# Patient Record
Sex: FEMALE | Race: WHITE | NOT HISPANIC OR LATINO | Employment: FULL TIME | URBAN - METROPOLITAN AREA
[De-identification: names, ages, dates, MRNs, and addresses within clinical notes are randomized per-mention and may not be internally consistent; named-entity substitution may affect disease eponyms.]

---

## 2019-05-16 ENCOUNTER — OFFICE VISIT (OUTPATIENT)
Dept: RETAIL CLINIC | Facility: CLINIC | Age: 34
End: 2019-05-16

## 2019-05-16 VITALS
OXYGEN SATURATION: 99 % | DIASTOLIC BLOOD PRESSURE: 92 MMHG | RESPIRATION RATE: 18 BRPM | TEMPERATURE: 98.7 F | SYSTOLIC BLOOD PRESSURE: 140 MMHG | HEART RATE: 125 BPM

## 2019-05-16 DIAGNOSIS — H66.91 ACUTE OTITIS MEDIA, RIGHT: ICD-10-CM

## 2019-05-16 DIAGNOSIS — J02.9 PHARYNGITIS, UNSPECIFIED ETIOLOGY: Primary | ICD-10-CM

## 2019-05-16 DIAGNOSIS — J06.9 ACUTE URI: ICD-10-CM

## 2019-05-16 PROCEDURE — 99213 OFFICE O/P EST LOW 20 MIN: CPT | Performed by: NURSE PRACTITIONER

## 2019-05-16 RX ORDER — AMOXICILLIN 875 MG/1
875 TABLET, COATED ORAL EVERY 12 HOURS SCHEDULED
Qty: 20 TABLET | Refills: 0 | Status: SHIPPED | OUTPATIENT
Start: 2019-05-16 | End: 2019-05-26

## 2019-05-16 NOTE — PATIENT INSTRUCTIONS
Pharyngitis  Pharyngitis is redness, pain, and swelling (inflammation) of the throat (pharynx). It is a very common cause of sore throat. Pharyngitis can be caused by a bacteria, but it is usually caused by a virus. Most cases of pharyngitis get better on their own without treatment.  What are the causes?  This condition may be caused by:  · Infection by viruses (viral). Viral pharyngitis spreads from person to person (is contagious) through coughing, sneezing, and sharing of personal items or utensils such as cups, forks, spoons, and toothbrushes.  · Infection by bacteria (bacterial). Bacterial pharyngitis may be spread by touching the nose or face after coming in contact with the bacteria, or through more intimate contact, such as kissing.  · Allergies. Allergies can cause buildup of mucus in the throat (post-nasal drip), leading to inflammation and irritation. Allergies can also cause blocked nasal passages, forcing breathing through the mouth, which dries and irritates the throat.    What increases the risk?  You are more likely to develop this condition if:  · You are 5-24 years old.  · You are exposed to crowded environments such as , school, or dormitory living.  · You live in a cold climate.  · You have a weakened disease-fighting (immune) system.    What are the signs or symptoms?  Symptoms of this condition vary by the cause (viral, bacterial, or allergies) and can include:  · Sore throat.  · Fatigue.  · Low-grade fever.  · Headache.  · Joint pain and muscle aches.  · Skin rashes.  · Swollen glands in the throat (lymph nodes).  · Plaque-like film on the throat or tonsils. This is often a symptom of bacterial pharyngitis.  · Vomiting.  · Stuffy nose (nasal congestion).  · Cough.  · Red, itchy eyes (conjunctivitis).  · Loss of appetite.    How is this diagnosed?  This condition is often diagnosed based on your medical history and a physical exam. Your health care provider will ask you questions about  your illness and your symptoms. A swab of your throat may be done to check for bacteria (rapid strep test). Other lab tests may also be done, depending on the suspected cause, but these are rare.  How is this treated?  This condition usually gets better in 3-4 days without medicine. Bacterial pharyngitis may be treated with antibiotic medicines.  Follow these instructions at home:  · Take over-the-counter and prescription medicines only as told by your health care provider.  ? If you were prescribed an antibiotic medicine, take it as told by your health care provider. Do not stop taking the antibiotic even if you start to feel better.  ? Do not give children aspirin because of the association with Reye syndrome.  · Drink enough water and fluids to keep your urine clear or pale yellow.  · Get a lot of rest.  · Gargle with a salt-water mixture 3-4 times a day or as needed. To make a salt-water mixture, completely dissolve ½-1 tsp of salt in 1 cup of warm water.  · If your health care provider approves, you may use throat lozenges or sprays to soothe your throat.  Contact a health care provider if:  · You have large, tender lumps in your neck.  · You have a rash.  · You cough up green, yellow-brown, or bloody spit.  Get help right away if:  · Your neck becomes stiff.  · You drool or are unable to swallow liquids.  · You cannot drink or take medicines without vomiting.  · You have severe pain that does not go away, even after you take medicine.  · You have trouble breathing, and it is not caused by a stuffy nose.  · You have new pain and swelling in your joints such as the knees, ankles, wrists, or elbows.  Summary  · Pharyngitis is redness, pain, and swelling (inflammation) of the throat (pharynx).  · While pharyngitis can be caused by a bacteria, the most common causes are viral.  · Most cases of pharyngitis get better on their own without treatment.  · Bacterial pharyngitis is treated with antibiotic medicines.  This  information is not intended to replace advice given to you by your health care provider. Make sure you discuss any questions you have with your health care provider.  Document Released: 12/18/2006 Document Revised: 01/23/2018 Document Reviewed: 01/23/2018  [employer] Interactive Patient Education © 2019 [employer]  Otitis Media, Adult  Otitis media occurs when there is inflammation and fluid in the middle ear. Your middle ear is a part of the ear that contains bones for hearing as well as air that helps send sounds to your brain.  What are the causes?  This condition is caused by a blockage in the eustachian tube. This tube drains fluid from the ear to the back of the nose (nasopharynx). A blockage in this tube can be caused by an object or by swelling (edema) in the tube. Problems that can cause a blockage include:  · A cold or other upper respiratory infection.  · Allergies.  · An irritant, such as tobacco smoke.  · Enlarged adenoids. The adenoids are areas of soft tissue located high in the back of the throat, behind the nose and the roof of the mouth.  · A mass in the nasopharynx.  · Damage to the ear caused by pressure changes (barotrauma).    What are the signs or symptoms?  Symptoms of this condition include:  · Ear pain.  · A fever.  · Decreased hearing.  · A headache.  · Tiredness (lethargy).  · Fluid leaking from the ear.  · Ringing in the ear.    How is this diagnosed?  This condition is diagnosed with a physical exam. During the exam your health care provider will use an instrument called an otoscope to look into your ear and check for redness, swelling, and fluid. He or she will also ask about your symptoms.  Your health care provider may also order tests, such as:  · A test to check the movement of the eardrum (pneumatic otoscopy). This test is done by squeezing a small amount of air into the ear.  · A test that changes air pressure in the middle ear to check how well the eardrum moves and whether the  eustachian tube is working (tympanogram).    How is this treated?  This condition usually goes away on its own within 3-5 days. But if the condition is caused by a bacteria infection and does not go away own its own, or keeps coming back, your health care provider may:  · Prescribe antibiotic medicines to treat the infection.  · Prescribe or recommend medicines to control pain.    Follow these instructions at home:  · Take over-the-counter and prescription medicines only as told by your health care provider.  · If you were prescribed an antibiotic medicine, take it as told by your health care provider. Do not stop taking the antibiotic even if you start to feel better.  · Keep all follow-up visits as told by your health care provider. This is important.  Contact a health care provider if:  · You have bleeding from your nose.  · There is a lump on your neck.  · You are not getting better in 5 days.  · You feel worse instead of better.  Get help right away if:  · You have severe pain that is not controlled with medicine.  · You have swelling, redness, or pain around your ear.  · You have stiffness in your neck.  · A part of your face is paralyzed.  · The bone behind your ear (mastoid) is tender when you touch it.  · You develop a severe headache.  Summary  · Otitis media is redness, soreness, and swelling of the middle ear.  · This condition usually goes away on its own within 3-5 days.  · If the problem does not go away in 3-5 days, your health care provider may prescribe or recommend medicines to treat your symptoms.  · If you were prescribed an antibiotic medicine, take it as told by your health care provider.  This information is not intended to replace advice given to you by your health care provider. Make sure you discuss any questions you have with your health care provider.  Document Released: 09/22/2005 Document Revised: 12/08/2017 Document Reviewed: 12/08/2017  [employer] Interactive Patient Education © 2019  [employer]  Upper Respiratory Infection, Adult  An upper respiratory infection (URI) is a common viral infection of the nose, throat, and upper air passages that lead to the lungs. The most common type of URI is the common cold. URIs usually get better on their own, without medical treatment.  What are the causes?  A URI is caused by a virus. You may catch a virus by:  · Breathing in droplets from an infected person's cough or sneeze.  · Touching something that has been exposed to the virus (contaminated) and then touching your mouth, nose, or eyes.    What increases the risk?  You are more likely to get a URI if:  · You are very young or very old.  · It is jessica or winter.  · You have close contact with others, such as at a , school, or health care facility.  · You smoke.  · You have long-term (chronic) heart or lung disease.  · You have a weakened disease-fighting (immune) system.  · You have nasal allergies or asthma.  · You are experiencing a lot of stress.  · You work in an area that has poor air circulation.  · You have poor nutrition.    What are the signs or symptoms?  A URI usually involves some of the following symptoms:  · Runny or stuffy (congested) nose.  · Sneezing.  · Cough.  · Sore throat.  · Headache.  · Fatigue.  · Fever.  · Loss of appetite.  · Pain in your forehead, behind your eyes, and over your cheekbones (sinus pain).  · Muscle aches.  · Redness or irritation of the eyes.  · Pressure in the ears or face.    How is this diagnosed?  This condition may be diagnosed based on your medical history and symptoms, and a physical exam. Your health care provider may use a cotton swab to take a mucus sample from your nose (nasal swab). This sample can be tested to determine what virus is causing the illness.  How is this treated?  URIs usually get better on their own within 7-10 days. You can take steps at home to relieve your symptoms. Medicines cannot cure URIs, but your health care provider  may recommend certain medicines to help relieve symptoms, such as:  · Over-the-counter cold medicines.  · Cough suppressants. Coughing is a type of defense against infection that helps to clear the respiratory system, so take these medicines only as recommended by your health care provider.  · Fever-reducing medicines.    Follow these instructions at home:  Activity  · Rest as needed.  · If you have a fever, stay home from work or school until your fever is gone or until your health care provider says you are no longer contagious. Your health care provider may have you wear a face mask to prevent your infection from spreading.  Relieving symptoms  · Gargle with a salt-water mixture 3-4 times a day or as needed. To make a salt-water mixture, completely dissolve ½-1 tsp of salt in 1 cup of warm water.  · Use a cool-mist humidifier to add moisture to the air. This can help you breathe more easily.  Eating and drinking  · Drink enough fluid to keep your urine pale yellow.  · Eat soups and other clear broths.  General instructions  · Take over-the-counter and prescription medicines only as told by your health care provider. These include cold medicines, fever reducers, and cough suppressants.  · Do not use any products that contain nicotine or tobacco, such as cigarettes and e-cigarettes. If you need help quitting, ask your health care provider.  · Stay away from secondhand smoke.  · Stay up to date on all immunizations, including the yearly (annual) flu vaccine.  · Keep all follow-up visits as told by your health care provider. This is important.  How to prevent the spread of infection to others  · URIs can be passed from person to person (are contagious). To prevent the infection from spreading:  ? Wash your hands often with soap and water. If soap and water are not available, use hand .  ? Avoid touching your mouth, face, eyes, or nose.  ? Cough or sneeze into a tissue or your sleeve or elbow instead of into  your hand or into the air.  Contact a health care provider if:  · You are getting worse instead of better.  · You have a fever or chills.  · Your mucus is brown or red.  · You have yellow or brown discharge coming from your nose.  · You have pain in your face, especially when you bend forward.  · You have swollen neck glands.  · You have pain while swallowing.  · You have white areas in the back of your throat.  Get help right away if:  · You have shortness of breath that gets worse.  · You have severe or persistent:  ? Headache.  ? Ear pain.  ? Sinus pain.  ? Chest pain.  · You have chronic lung disease along with any of the following:  ? Wheezing.  ? Prolonged cough.  ? Coughing up blood.  ? A change in your usual mucus.  · You have a stiff neck.  · You have changes in your:  ? Vision.  ? Hearing.  ? Thinking.  ? Mood.  Summary  · An upper respiratory infection (URI) is a common infection of the nose, throat, and upper air passages that lead to the lungs.  · A URI is caused by a virus.  · URIs usually get better on their own within 7-10 days.  · Medicines cannot cure URIs, but your health care provider may recommend certain medicines to help relieve symptoms.  This information is not intended to replace advice given to you by your health care provider. Make sure you discuss any questions you have with your health care provider.  Document Released: 06/13/2002 Document Revised: 08/03/2018 Document Reviewed: 08/03/2018  [employer] Interactive Patient Education © 2019 [employer]

## 2019-05-16 NOTE — PROGRESS NOTES
Subjective   Patient ID: Magy Quach is a 34 y.o. female presents with   Chief Complaint   Patient presents with   • Sore Throat       Sore Throat    This is a new problem. The current episode started yesterday. The problem has been gradually worsening. Neither side of throat is experiencing more pain than the other. Maximum temperature: 99.5. The pain is at a severity of 6/10. Associated symptoms include congestion, coughing, ear pain (right) and headaches. Pertinent negatives include no shortness of breath. She has had exposure to strep. She has tried NSAIDs for the symptoms. The treatment provided no relief.       No Known Allergies    The following portions of the patient's history were reviewed and updated as appropriate: allergies, current medications, past family history, past medical history, past social history, past surgical history and problem list.      Review of Systems   Constitutional: Positive for chills, diaphoresis, fatigue and fever.   HENT: Positive for congestion, ear pain (right), postnasal drip, rhinorrhea, sinus pressure, sneezing and sore throat.    Respiratory: Positive for cough. Negative for chest tightness, shortness of breath and wheezing.    Cardiovascular: Negative.    Gastrointestinal: Negative.    Neurological: Positive for headaches.       Objective     Vitals:    05/16/19 1607   BP: 140/92   Pulse: (!) 125   Resp: 18   Temp: 98.7 °F (37.1 °C)   SpO2: 99%         Physical Exam   Constitutional: She is oriented to person, place, and time. She appears well-developed and well-nourished. She does not appear ill. No distress.   HENT:   Head: Normocephalic.   Right Ear: Hearing, external ear and ear canal normal. Tympanic membrane is erythematous.   Left Ear: Hearing, tympanic membrane, external ear and ear canal normal.   Nose: Mucosal edema present. No rhinorrhea or sinus tenderness.   Mouth/Throat: Mucous membranes are normal. Posterior oropharyngeal erythema present. Tonsils are 2+  on the right. Tonsils are 3+ on the left. No tonsillar exudate.   Eyes: Conjunctivae are normal.   Sclera white.   Neck: No tracheal deviation present.   Cardiovascular: Normal rate, regular rhythm, S1 normal, S2 normal and normal heart sounds.   Pulmonary/Chest: Effort normal and breath sounds normal. No accessory muscle usage. No respiratory distress.   Abdominal: Soft. Bowel sounds are normal. There is no tenderness.   Lymphadenopathy:     She has cervical adenopathy.        Right cervical: Superficial cervical adenopathy present.        Left cervical: Superficial cervical adenopathy present.   Neurological: She is alert and oriented to person, place, and time.   Skin: Skin is warm and dry.   Vitals reviewed.        Magy was seen today for sore throat.    Diagnoses and all orders for this visit:    Pharyngitis, unspecified etiology    Acute otitis media, right  -     amoxicillin (AMOXIL) 875 MG tablet; Take 1 tablet by mouth Every 12 (Twelve) Hours for 10 days.    Acute URI        Patient understands possible side effects of all medications ordered. Follow-up with Primary Care Physician in 48-72 hours if these symptoms worsen or fail to improve as anticipated. Patient verbalizes understanding.    Pharyngitis  Pharyngitis is redness, pain, and swelling (inflammation) of the throat (pharynx). It is a very common cause of sore throat. Pharyngitis can be caused by a bacteria, but it is usually caused by a virus. Most cases of pharyngitis get better on their own without treatment.  What are the causes?  This condition may be caused by:  · Infection by viruses (viral). Viral pharyngitis spreads from person to person (is contagious) through coughing, sneezing, and sharing of personal items or utensils such as cups, forks, spoons, and toothbrushes.  · Infection by bacteria (bacterial). Bacterial pharyngitis may be spread by touching the nose or face after coming in contact with the bacteria, or through more intimate  contact, such as kissing.  · Allergies. Allergies can cause buildup of mucus in the throat (post-nasal drip), leading to inflammation and irritation. Allergies can also cause blocked nasal passages, forcing breathing through the mouth, which dries and irritates the throat.    What increases the risk?  You are more likely to develop this condition if:  · You are 5-24 years old.  · You are exposed to crowded environments such as , school, or dormitory living.  · You live in a cold climate.  · You have a weakened disease-fighting (immune) system.    What are the signs or symptoms?  Symptoms of this condition vary by the cause (viral, bacterial, or allergies) and can include:  · Sore throat.  · Fatigue.  · Low-grade fever.  · Headache.  · Joint pain and muscle aches.  · Skin rashes.  · Swollen glands in the throat (lymph nodes).  · Plaque-like film on the throat or tonsils. This is often a symptom of bacterial pharyngitis.  · Vomiting.  · Stuffy nose (nasal congestion).  · Cough.  · Red, itchy eyes (conjunctivitis).  · Loss of appetite.    How is this diagnosed?  This condition is often diagnosed based on your medical history and a physical exam. Your health care provider will ask you questions about your illness and your symptoms. A swab of your throat may be done to check for bacteria (rapid strep test). Other lab tests may also be done, depending on the suspected cause, but these are rare.  How is this treated?  This condition usually gets better in 3-4 days without medicine. Bacterial pharyngitis may be treated with antibiotic medicines.  Follow these instructions at home:  · Take over-the-counter and prescription medicines only as told by your health care provider.  ? If you were prescribed an antibiotic medicine, take it as told by your health care provider. Do not stop taking the antibiotic even if you start to feel better.  ? Do not give children aspirin because of the association with Reye  syndrome.  · Drink enough water and fluids to keep your urine clear or pale yellow.  · Get a lot of rest.  · Gargle with a salt-water mixture 3-4 times a day or as needed. To make a salt-water mixture, completely dissolve ½-1 tsp of salt in 1 cup of warm water.  · If your health care provider approves, you may use throat lozenges or sprays to soothe your throat.  Contact a health care provider if:  · You have large, tender lumps in your neck.  · You have a rash.  · You cough up green, yellow-brown, or bloody spit.  Get help right away if:  · Your neck becomes stiff.  · You drool or are unable to swallow liquids.  · You cannot drink or take medicines without vomiting.  · You have severe pain that does not go away, even after you take medicine.  · You have trouble breathing, and it is not caused by a stuffy nose.  · You have new pain and swelling in your joints such as the knees, ankles, wrists, or elbows.  Summary  · Pharyngitis is redness, pain, and swelling (inflammation) of the throat (pharynx).  · While pharyngitis can be caused by a bacteria, the most common causes are viral.  · Most cases of pharyngitis get better on their own without treatment.  · Bacterial pharyngitis is treated with antibiotic medicines.  This information is not intended to replace advice given to you by your health care provider. Make sure you discuss any questions you have with your health care provider.  Document Released: 12/18/2006 Document Revised: 01/23/2018 Document Reviewed: 01/23/2018  [employer] Interactive Patient Education © 2019 [employer]  Otitis Media, Adult  Otitis media occurs when there is inflammation and fluid in the middle ear. Your middle ear is a part of the ear that contains bones for hearing as well as air that helps send sounds to your brain.  What are the causes?  This condition is caused by a blockage in the eustachian tube. This tube drains fluid from the ear to the back of the nose (nasopharynx). A blockage in  this tube can be caused by an object or by swelling (edema) in the tube. Problems that can cause a blockage include:  · A cold or other upper respiratory infection.  · Allergies.  · An irritant, such as tobacco smoke.  · Enlarged adenoids. The adenoids are areas of soft tissue located high in the back of the throat, behind the nose and the roof of the mouth.  · A mass in the nasopharynx.  · Damage to the ear caused by pressure changes (barotrauma).    What are the signs or symptoms?  Symptoms of this condition include:  · Ear pain.  · A fever.  · Decreased hearing.  · A headache.  · Tiredness (lethargy).  · Fluid leaking from the ear.  · Ringing in the ear.    How is this diagnosed?  This condition is diagnosed with a physical exam. During the exam your health care provider will use an instrument called an otoscope to look into your ear and check for redness, swelling, and fluid. He or she will also ask about your symptoms.  Your health care provider may also order tests, such as:  · A test to check the movement of the eardrum (pneumatic otoscopy). This test is done by squeezing a small amount of air into the ear.  · A test that changes air pressure in the middle ear to check how well the eardrum moves and whether the eustachian tube is working (tympanogram).    How is this treated?  This condition usually goes away on its own within 3-5 days. But if the condition is caused by a bacteria infection and does not go away own its own, or keeps coming back, your health care provider may:  · Prescribe antibiotic medicines to treat the infection.  · Prescribe or recommend medicines to control pain.    Follow these instructions at home:  · Take over-the-counter and prescription medicines only as told by your health care provider.  · If you were prescribed an antibiotic medicine, take it as told by your health care provider. Do not stop taking the antibiotic even if you start to feel better.  · Keep all follow-up visits as  told by your health care provider. This is important.  Contact a health care provider if:  · You have bleeding from your nose.  · There is a lump on your neck.  · You are not getting better in 5 days.  · You feel worse instead of better.  Get help right away if:  · You have severe pain that is not controlled with medicine.  · You have swelling, redness, or pain around your ear.  · You have stiffness in your neck.  · A part of your face is paralyzed.  · The bone behind your ear (mastoid) is tender when you touch it.  · You develop a severe headache.  Summary  · Otitis media is redness, soreness, and swelling of the middle ear.  · This condition usually goes away on its own within 3-5 days.  · If the problem does not go away in 3-5 days, your health care provider may prescribe or recommend medicines to treat your symptoms.  · If you were prescribed an antibiotic medicine, take it as told by your health care provider.  This information is not intended to replace advice given to you by your health care provider. Make sure you discuss any questions you have with your health care provider.  Document Released: 09/22/2005 Document Revised: 12/08/2017 Document Reviewed: 12/08/2017  [employer] Interactive Patient Education © 2019 [employer]  Upper Respiratory Infection, Adult  An upper respiratory infection (URI) is a common viral infection of the nose, throat, and upper air passages that lead to the lungs. The most common type of URI is the common cold. URIs usually get better on their own, without medical treatment.  What are the causes?  A URI is caused by a virus. You may catch a virus by:  · Breathing in droplets from an infected person's cough or sneeze.  · Touching something that has been exposed to the virus (contaminated) and then touching your mouth, nose, or eyes.    What increases the risk?  You are more likely to get a URI if:  · You are very young or very old.  · It is jessica or winter.  · You have close  contact with others, such as at a , school, or health care facility.  · You smoke.  · You have long-term (chronic) heart or lung disease.  · You have a weakened disease-fighting (immune) system.  · You have nasal allergies or asthma.  · You are experiencing a lot of stress.  · You work in an area that has poor air circulation.  · You have poor nutrition.    What are the signs or symptoms?  A URI usually involves some of the following symptoms:  · Runny or stuffy (congested) nose.  · Sneezing.  · Cough.  · Sore throat.  · Headache.  · Fatigue.  · Fever.  · Loss of appetite.  · Pain in your forehead, behind your eyes, and over your cheekbones (sinus pain).  · Muscle aches.  · Redness or irritation of the eyes.  · Pressure in the ears or face.    How is this diagnosed?  This condition may be diagnosed based on your medical history and symptoms, and a physical exam. Your health care provider may use a cotton swab to take a mucus sample from your nose (nasal swab). This sample can be tested to determine what virus is causing the illness.  How is this treated?  URIs usually get better on their own within 7-10 days. You can take steps at home to relieve your symptoms. Medicines cannot cure URIs, but your health care provider may recommend certain medicines to help relieve symptoms, such as:  · Over-the-counter cold medicines.  · Cough suppressants. Coughing is a type of defense against infection that helps to clear the respiratory system, so take these medicines only as recommended by your health care provider.  · Fever-reducing medicines.    Follow these instructions at home:  Activity  · Rest as needed.  · If you have a fever, stay home from work or school until your fever is gone or until your health care provider says you are no longer contagious. Your health care provider may have you wear a face mask to prevent your infection from spreading.  Relieving symptoms  · Gargle with a salt-water mixture 3-4 times a  day or as needed. To make a salt-water mixture, completely dissolve ½-1 tsp of salt in 1 cup of warm water.  · Use a cool-mist humidifier to add moisture to the air. This can help you breathe more easily.  Eating and drinking  · Drink enough fluid to keep your urine pale yellow.  · Eat soups and other clear broths.  General instructions  · Take over-the-counter and prescription medicines only as told by your health care provider. These include cold medicines, fever reducers, and cough suppressants.  · Do not use any products that contain nicotine or tobacco, such as cigarettes and e-cigarettes. If you need help quitting, ask your health care provider.  · Stay away from secondhand smoke.  · Stay up to date on all immunizations, including the yearly (annual) flu vaccine.  · Keep all follow-up visits as told by your health care provider. This is important.  How to prevent the spread of infection to others  · URIs can be passed from person to person (are contagious). To prevent the infection from spreading:  ? Wash your hands often with soap and water. If soap and water are not available, use hand .  ? Avoid touching your mouth, face, eyes, or nose.  ? Cough or sneeze into a tissue or your sleeve or elbow instead of into your hand or into the air.  Contact a health care provider if:  · You are getting worse instead of better.  · You have a fever or chills.  · Your mucus is brown or red.  · You have yellow or brown discharge coming from your nose.  · You have pain in your face, especially when you bend forward.  · You have swollen neck glands.  · You have pain while swallowing.  · You have white areas in the back of your throat.  Get help right away if:  · You have shortness of breath that gets worse.  · You have severe or persistent:  ? Headache.  ? Ear pain.  ? Sinus pain.  ? Chest pain.  · You have chronic lung disease along with any of the following:  ? Wheezing.  ? Prolonged cough.  ? Coughing up  blood.  ? A change in your usual mucus.  · You have a stiff neck.  · You have changes in your:  ? Vision.  ? Hearing.  ? Thinking.  ? Mood.  Summary  · An upper respiratory infection (URI) is a common infection of the nose, throat, and upper air passages that lead to the lungs.  · A URI is caused by a virus.  · URIs usually get better on their own within 7-10 days.  · Medicines cannot cure URIs, but your health care provider may recommend certain medicines to help relieve symptoms.  This information is not intended to replace advice given to you by your health care provider. Make sure you discuss any questions you have with your health care provider.  Document Released: 06/13/2002 Document Revised: 08/03/2018 Document Reviewed: 08/03/2018  [employer] Interactive Patient Education © 2019 [employer]

## 2020-10-06 ENCOUNTER — APPOINTMENT (OUTPATIENT)
Dept: ULTRASOUND IMAGING | Facility: HOSPITAL | Age: 35
End: 2020-10-06

## 2020-10-06 ENCOUNTER — APPOINTMENT (OUTPATIENT)
Dept: CT IMAGING | Facility: HOSPITAL | Age: 35
End: 2020-10-06

## 2020-10-06 ENCOUNTER — HOSPITAL ENCOUNTER (INPATIENT)
Facility: HOSPITAL | Age: 35
LOS: 2 days | Discharge: HOME | End: 2020-10-08
Attending: EMERGENCY MEDICINE | Admitting: SURGERY

## 2020-10-06 DIAGNOSIS — K81.9 CHOLECYSTITIS: ICD-10-CM

## 2020-10-06 DIAGNOSIS — K81.0 ACUTE CHOLECYSTITIS: Primary | ICD-10-CM

## 2020-10-06 LAB
ALBUMIN SERPL-MCNC: 5 G/DL (ref 3.5–5.2)
ALBUMIN/GLOB SERPL: 1.6 G/DL
ALP SERPL-CCNC: 96 U/L (ref 39–117)
ALT SERPL W P-5'-P-CCNC: 29 U/L (ref 1–33)
ANION GAP SERPL CALCULATED.3IONS-SCNC: 12.2 MMOL/L (ref 5–15)
AST SERPL-CCNC: 23 U/L (ref 1–32)
B PARAPERT DNA SPEC QL NAA+PROBE: NOT DETECTED
B PERT DNA SPEC QL NAA+PROBE: NOT DETECTED
BACTERIA UR QL AUTO: ABNORMAL /HPF
BILIRUB SERPL-MCNC: 0.2 MG/DL (ref 0–1.2)
BILIRUB UR QL STRIP: NEGATIVE
BUN SERPL-MCNC: 15 MG/DL (ref 6–20)
BUN/CREAT SERPL: 15.6 (ref 7–25)
C PNEUM DNA NPH QL NAA+NON-PROBE: NOT DETECTED
CALCIUM SPEC-SCNC: 9.8 MG/DL (ref 8.6–10.5)
CHLORIDE SERPL-SCNC: 99 MMOL/L (ref 98–107)
CLARITY UR: CLEAR
CO2 SERPL-SCNC: 28.8 MMOL/L (ref 22–29)
COLOR UR: YELLOW
CREAT SERPL-MCNC: 0.96 MG/DL (ref 0.57–1)
DEPRECATED RDW RBC AUTO: 34.8 FL (ref 37–54)
EOSINOPHIL # BLD MANUAL: 0.15 10*3/MM3 (ref 0–0.4)
EOSINOPHIL NFR BLD MANUAL: 1.1 % (ref 0.3–6.2)
ERYTHROCYTE [DISTWIDTH] IN BLOOD BY AUTOMATED COUNT: 13.3 % (ref 12.3–15.4)
FLUAV H1 2009 PAND RNA NPH QL NAA+PROBE: NOT DETECTED
FLUAV H1 HA GENE NPH QL NAA+PROBE: NOT DETECTED
FLUAV H3 RNA NPH QL NAA+PROBE: NOT DETECTED
FLUAV SUBTYP SPEC NAA+PROBE: NOT DETECTED
FLUBV RNA ISLT QL NAA+PROBE: NOT DETECTED
GFR SERPL CREATININE-BSD FRML MDRD: 67 ML/MIN/1.73
GLOBULIN UR ELPH-MCNC: 3.1 GM/DL
GLUCOSE SERPL-MCNC: 87 MG/DL (ref 65–99)
GLUCOSE UR STRIP-MCNC: NEGATIVE MG/DL
HADV DNA SPEC NAA+PROBE: NOT DETECTED
HCG SERPL QL: NEGATIVE
HCOV 229E RNA SPEC QL NAA+PROBE: NOT DETECTED
HCOV HKU1 RNA SPEC QL NAA+PROBE: NOT DETECTED
HCOV NL63 RNA SPEC QL NAA+PROBE: NOT DETECTED
HCOV OC43 RNA SPEC QL NAA+PROBE: NOT DETECTED
HCT VFR BLD AUTO: 39.5 % (ref 34–46.6)
HGB BLD-MCNC: 13.2 G/DL (ref 12–15.9)
HGB UR QL STRIP.AUTO: NEGATIVE
HMPV RNA NPH QL NAA+NON-PROBE: NOT DETECTED
HOLD SPECIMEN: NORMAL
HPIV1 RNA SPEC QL NAA+PROBE: NOT DETECTED
HPIV2 RNA SPEC QL NAA+PROBE: NOT DETECTED
HPIV3 RNA NPH QL NAA+PROBE: NOT DETECTED
HPIV4 P GENE NPH QL NAA+PROBE: NOT DETECTED
HYALINE CASTS UR QL AUTO: ABNORMAL /LPF
KETONES UR QL STRIP: ABNORMAL
LEUKOCYTE ESTERASE UR QL STRIP.AUTO: NEGATIVE
LIPASE SERPL-CCNC: 12 U/L (ref 13–60)
LYMPHOCYTES # BLD MANUAL: 1.56 10*3/MM3 (ref 0.7–3.1)
LYMPHOCYTES NFR BLD MANUAL: 11.6 % (ref 19.6–45.3)
LYMPHOCYTES NFR BLD MANUAL: 5.3 % (ref 5–12)
M PNEUMO IGG SER IA-ACNC: NOT DETECTED
MCH RBC QN AUTO: 24.9 PG (ref 26.6–33)
MCHC RBC AUTO-ENTMCNC: 33.4 G/DL (ref 31.5–35.7)
MCV RBC AUTO: 74.5 FL (ref 79–97)
MONOCYTES # BLD AUTO: 0.71 10*3/MM3 (ref 0.1–0.9)
NEUTROPHILS # BLD AUTO: 11.03 10*3/MM3 (ref 1.7–7)
NEUTROPHILS NFR BLD MANUAL: 82.1 % (ref 42.7–76)
NITRITE UR QL STRIP: NEGATIVE
PH UR STRIP.AUTO: 5.5 [PH] (ref 5–8)
PLAT MORPH BLD: NORMAL
PLATELET # BLD AUTO: 375 10*3/MM3 (ref 140–450)
PMV BLD AUTO: 9.1 FL (ref 6–12)
POTASSIUM SERPL-SCNC: 4.5 MMOL/L (ref 3.5–5.2)
PROT SERPL-MCNC: 8.1 G/DL (ref 6–8.5)
PROT UR QL STRIP: ABNORMAL
RBC # BLD AUTO: 5.3 10*6/MM3 (ref 3.77–5.28)
RBC # UR: ABNORMAL /HPF
RBC MORPH BLD: NORMAL
REF LAB TEST METHOD: ABNORMAL
RHINOVIRUS RNA SPEC NAA+PROBE: DETECTED
RSV RNA NPH QL NAA+NON-PROBE: NOT DETECTED
SARS-COV-2 RNA NPH QL NAA+NON-PROBE: NOT DETECTED
SODIUM SERPL-SCNC: 140 MMOL/L (ref 136–145)
SP GR UR STRIP: >=1.03 (ref 1–1.03)
SQUAMOUS #/AREA URNS HPF: ABNORMAL /HPF
UROBILINOGEN UR QL STRIP: ABNORMAL
WBC # BLD AUTO: 13.43 10*3/MM3 (ref 3.4–10.8)
WBC MORPH BLD: NORMAL
WBC UR QL AUTO: ABNORMAL /HPF
WHOLE BLOOD HOLD SPECIMEN: NORMAL
WHOLE BLOOD HOLD SPECIMEN: NORMAL

## 2020-10-06 PROCEDURE — 80053 COMPREHEN METABOLIC PANEL: CPT

## 2020-10-06 PROCEDURE — 81001 URINALYSIS AUTO W/SCOPE: CPT

## 2020-10-06 PROCEDURE — 74177 CT ABD & PELVIS W/CONTRAST: CPT

## 2020-10-06 PROCEDURE — 25010000002 ONDANSETRON PER 1 MG: Performed by: EMERGENCY MEDICINE

## 2020-10-06 PROCEDURE — 25010000002 BUTORPHANOL PER 1 MG: Performed by: SURGERY

## 2020-10-06 PROCEDURE — 25010000002 BUTORPHANOL PER 1 MG: Performed by: EMERGENCY MEDICINE

## 2020-10-06 PROCEDURE — 76705 ECHO EXAM OF ABDOMEN: CPT

## 2020-10-06 PROCEDURE — 25010000002 HYDROMORPHONE PER 4 MG: Performed by: SURGERY

## 2020-10-06 PROCEDURE — 25010000002 KETOROLAC TROMETHAMINE PER 15 MG: Performed by: EMERGENCY MEDICINE

## 2020-10-06 PROCEDURE — 99285 EMERGENCY DEPT VISIT HI MDM: CPT

## 2020-10-06 PROCEDURE — 85025 COMPLETE CBC W/AUTO DIFF WBC: CPT

## 2020-10-06 PROCEDURE — 81025 URINE PREGNANCY TEST: CPT | Performed by: SURGERY

## 2020-10-06 PROCEDURE — 83690 ASSAY OF LIPASE: CPT

## 2020-10-06 PROCEDURE — 25010000002 IOPAMIDOL 61 % SOLUTION: Performed by: EMERGENCY MEDICINE

## 2020-10-06 PROCEDURE — 99222 1ST HOSP IP/OBS MODERATE 55: CPT | Performed by: SURGERY

## 2020-10-06 PROCEDURE — 25010000002 PIPERACILLIN SOD-TAZOBACTAM PER 1 G: Performed by: EMERGENCY MEDICINE

## 2020-10-06 PROCEDURE — 0202U NFCT DS 22 TRGT SARS-COV-2: CPT | Performed by: EMERGENCY MEDICINE

## 2020-10-06 PROCEDURE — 25010000002 PIPERACILLIN SOD-TAZOBACTAM PER 1 G: Performed by: SURGERY

## 2020-10-06 PROCEDURE — 85007 BL SMEAR W/DIFF WBC COUNT: CPT

## 2020-10-06 PROCEDURE — 84703 CHORIONIC GONADOTROPIN ASSAY: CPT

## 2020-10-06 RX ORDER — SODIUM CHLORIDE 0.9 % (FLUSH) 0.9 %
10 SYRINGE (ML) INJECTION AS NEEDED
Status: DISCONTINUED | OUTPATIENT
Start: 2020-10-06 | End: 2020-10-08 | Stop reason: HOSPADM

## 2020-10-06 RX ORDER — DEXTROSE, SODIUM CHLORIDE, AND POTASSIUM CHLORIDE 5; .45; .15 G/100ML; G/100ML; G/100ML
100 INJECTION INTRAVENOUS CONTINUOUS
Status: DISCONTINUED | OUTPATIENT
Start: 2020-10-06 | End: 2020-10-07

## 2020-10-06 RX ORDER — NALOXONE HCL 0.4 MG/ML
0.4 VIAL (ML) INJECTION
Status: DISCONTINUED | OUTPATIENT
Start: 2020-10-06 | End: 2020-10-08 | Stop reason: HOSPADM

## 2020-10-06 RX ORDER — ONDANSETRON 2 MG/ML
4 INJECTION INTRAMUSCULAR; INTRAVENOUS ONCE
Status: COMPLETED | OUTPATIENT
Start: 2020-10-06 | End: 2020-10-06

## 2020-10-06 RX ORDER — SODIUM CHLORIDE 0.9 % (FLUSH) 0.9 %
10 SYRINGE (ML) INJECTION AS NEEDED
Status: DISCONTINUED | OUTPATIENT
Start: 2020-10-06 | End: 2020-10-06

## 2020-10-06 RX ORDER — SODIUM CHLORIDE 0.9 % (FLUSH) 0.9 %
10 SYRINGE (ML) INJECTION EVERY 12 HOURS SCHEDULED
Status: DISCONTINUED | OUTPATIENT
Start: 2020-10-06 | End: 2020-10-08 | Stop reason: HOSPADM

## 2020-10-06 RX ORDER — HYDROMORPHONE HYDROCHLORIDE 1 MG/ML
0.5 INJECTION, SOLUTION INTRAMUSCULAR; INTRAVENOUS; SUBCUTANEOUS
Status: DISCONTINUED | OUTPATIENT
Start: 2020-10-06 | End: 2020-10-08 | Stop reason: HOSPADM

## 2020-10-06 RX ORDER — KETOROLAC TROMETHAMINE 15 MG/ML
15 INJECTION, SOLUTION INTRAMUSCULAR; INTRAVENOUS ONCE
Status: COMPLETED | OUTPATIENT
Start: 2020-10-06 | End: 2020-10-06

## 2020-10-06 RX ORDER — ONDANSETRON 2 MG/ML
4 INJECTION INTRAMUSCULAR; INTRAVENOUS EVERY 6 HOURS PRN
Status: DISCONTINUED | OUTPATIENT
Start: 2020-10-06 | End: 2020-10-08 | Stop reason: HOSPADM

## 2020-10-06 RX ADMIN — TAZOBACTAM SODIUM AND PIPERACILLIN SODIUM 3.38 G: 375; 3 INJECTION, SOLUTION INTRAVENOUS at 18:05

## 2020-10-06 RX ADMIN — KETOROLAC TROMETHAMINE 15 MG: 15 INJECTION, SOLUTION INTRAMUSCULAR; INTRAVENOUS at 16:09

## 2020-10-06 RX ADMIN — IOPAMIDOL 85 ML: 612 INJECTION, SOLUTION INTRAVENOUS at 16:55

## 2020-10-06 RX ADMIN — BUTORPHANOL TARTRATE 1 MG: 2 INJECTION, SOLUTION INTRAMUSCULAR; INTRAVENOUS at 23:59

## 2020-10-06 RX ADMIN — TAZOBACTAM SODIUM AND PIPERACILLIN SODIUM 3.38 G: 375; 3 INJECTION, SOLUTION INTRAVENOUS at 23:27

## 2020-10-06 RX ADMIN — ONDANSETRON 4 MG: 2 INJECTION INTRAMUSCULAR; INTRAVENOUS at 17:58

## 2020-10-06 RX ADMIN — HYDROMORPHONE HYDROCHLORIDE 0.5 MG: 1 INJECTION, SOLUTION INTRAMUSCULAR; INTRAVENOUS; SUBCUTANEOUS at 20:32

## 2020-10-06 RX ADMIN — POTASSIUM CHLORIDE, DEXTROSE MONOHYDRATE AND SODIUM CHLORIDE 100 ML/HR: 150; 5; 450 INJECTION, SOLUTION INTRAVENOUS at 19:36

## 2020-10-06 RX ADMIN — METOROPROLOL TARTRATE 5 MG: 5 INJECTION, SOLUTION INTRAVENOUS at 18:03

## 2020-10-06 RX ADMIN — BUTORPHANOL TARTRATE 1 MG: 2 INJECTION, SOLUTION INTRAMUSCULAR; INTRAVENOUS at 17:59

## 2020-10-06 RX ADMIN — HYDROMORPHONE HYDROCHLORIDE 0.5 MG: 1 INJECTION, SOLUTION INTRAMUSCULAR; INTRAVENOUS; SUBCUTANEOUS at 22:40

## 2020-10-06 NOTE — ED TRIAGE NOTES
.Pt masked on arrival, RN wearing mask/goggles during encounter    Pt reports abd pain starting Sunday night, concerned for GB issue

## 2020-10-06 NOTE — ED PROVIDER NOTES
EMERGENCY DEPARTMENT ENCOUNTER    Room Number:  P676/1  Date of encounter:  10/6/2020  PCP: Pepper Crawford MD  Historian: Patient      HPI:  Chief Complaint: Abdominal pain  A complete HPI/ROS/PMH/PSH/SH/FH are unobtainable due to: Nothing    Context: Magy Quach is a 35 y.o. female who presents to the ED c/o severe abdominal pain which began last night.  The pain is localized in the right upper quadrant and radiates to the right shoulder.  The pain is sharp and stabbing, and initially was episodic.  Since last night, it is been constant, worsening, and nothing makes it better or worse.  She has nausea but no vomiting.  She has had no fever that she is aware of.  She feels very bloated but is not had a bowel movement.    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  She is an acquaintance of Dr. Jensen and has an appointment with him next week but the pain began to worsen so she came to the ED.  She is otherwise healthy.      PAST MEDICAL HISTORY  Active Ambulatory Problems     Diagnosis Date Noted   • No Active Ambulatory Problems     Resolved Ambulatory Problems     Diagnosis Date Noted   • No Resolved Ambulatory Problems     Past Medical History:   Diagnosis Date   • Hypertension    • Strep throat          PAST SURGICAL HISTORY  No past surgical history on file.      FAMILY HISTORY  Family History   Problem Relation Age of Onset   • COPD Mother    • Diabetes Father    • COPD Paternal Grandmother          SOCIAL HISTORY  Social History     Socioeconomic History   • Marital status:      Spouse name: Not on file   • Number of children: Not on file   • Years of education: Not on file   • Highest education level: Not on file   Tobacco Use   • Smoking status: Never Smoker   Substance and Sexual Activity   • Alcohol use: Yes     Comment: occasional   • Drug use: No         ALLERGIES  Patient has no known  allergies.        REVIEW OF SYSTEMS  Review of Systems     All systems reviewed and negative except for those discussed in HPI.       PHYSICAL EXAM    I have reviewed the triage vital signs and nursing notes.    ED Triage Vitals   Temp Heart Rate Resp BP SpO2   10/06/20 1253 10/06/20 1253 10/06/20 1253 10/06/20 1333 10/06/20 1253   98.5 °F (36.9 °C) (!) 133 18 (!) 148/103 100 %      Temp src Heart Rate Source Patient Position BP Location FiO2 (%)   -- -- -- -- --              Physical Exam  GENERAL: Awake and alert, moderate distress  HENT: nares patent  EYES: no scleral icterus  CV: Sinus tachycardia  RESPIRATORY: normal effort  ABDOMEN: soft, moderate tenderness in the right upper quadrant with voluntary guarding but no rebound.  Bowel sounds are present  MUSCULOSKELETAL: no deformity  NEURO: alert, moves all extremities, follows commands  SKIN: warm, dry        LAB RESULTS  Recent Results (from the past 24 hour(s))   Comprehensive Metabolic Panel    Collection Time: 10/06/20  1:54 PM    Specimen: Blood   Result Value Ref Range    Glucose 87 65 - 99 mg/dL    BUN 15 6 - 20 mg/dL    Creatinine 0.96 0.57 - 1.00 mg/dL    Sodium 140 136 - 145 mmol/L    Potassium 4.5 3.5 - 5.2 mmol/L    Chloride 99 98 - 107 mmol/L    CO2 28.8 22.0 - 29.0 mmol/L    Calcium 9.8 8.6 - 10.5 mg/dL    Total Protein 8.1 6.0 - 8.5 g/dL    Albumin 5.00 3.50 - 5.20 g/dL    ALT (SGPT) 29 1 - 33 U/L    AST (SGOT) 23 1 - 32 U/L    Alkaline Phosphatase 96 39 - 117 U/L    Total Bilirubin 0.2 0.0 - 1.2 mg/dL    eGFR Non African Amer 67 >60 mL/min/1.73    Globulin 3.1 gm/dL    A/G Ratio 1.6 g/dL    BUN/Creatinine Ratio 15.6 7.0 - 25.0    Anion Gap 12.2 5.0 - 15.0 mmol/L   Lipase    Collection Time: 10/06/20  1:54 PM    Specimen: Blood   Result Value Ref Range    Lipase 12 (L) 13 - 60 U/L   hCG, Serum, Qualitative    Collection Time: 10/06/20  1:54 PM    Specimen: Blood   Result Value Ref Range    HCG Qualitative Negative Negative   Light Blue Top     Collection Time: 10/06/20  1:54 PM   Result Value Ref Range    Extra Tube hold for add-on    Green Top (Gel)    Collection Time: 10/06/20  1:54 PM   Result Value Ref Range    Extra Tube Hold for add-ons.    Lavender Top    Collection Time: 10/06/20  1:54 PM   Result Value Ref Range    Extra Tube hold for add-on    CBC Auto Differential    Collection Time: 10/06/20  1:54 PM    Specimen: Blood   Result Value Ref Range    WBC 13.43 (H) 3.40 - 10.80 10*3/mm3    RBC 5.30 (H) 3.77 - 5.28 10*6/mm3    Hemoglobin 13.2 12.0 - 15.9 g/dL    Hematocrit 39.5 34.0 - 46.6 %    MCV 74.5 (L) 79.0 - 97.0 fL    MCH 24.9 (L) 26.6 - 33.0 pg    MCHC 33.4 31.5 - 35.7 g/dL    RDW 13.3 12.3 - 15.4 %    RDW-SD 34.8 (L) 37.0 - 54.0 fl    MPV 9.1 6.0 - 12.0 fL    Platelets 375 140 - 450 10*3/mm3   Manual Differential    Collection Time: 10/06/20  1:54 PM    Specimen: Blood   Result Value Ref Range    Neutrophil % 82.1 (H) 42.7 - 76.0 %    Lymphocyte % 11.6 (L) 19.6 - 45.3 %    Monocyte % 5.3 5.0 - 12.0 %    Eosinophil % 1.1 0.3 - 6.2 %    Neutrophils Absolute 11.03 (H) 1.70 - 7.00 10*3/mm3    Lymphocytes Absolute 1.56 0.70 - 3.10 10*3/mm3    Monocytes Absolute 0.71 0.10 - 0.90 10*3/mm3    Eosinophils Absolute 0.15 0.00 - 0.40 10*3/mm3    RBC Morphology Normal Normal    WBC Morphology Normal Normal    Platelet Morphology Normal Normal   Urinalysis With Microscopic If Indicated (No Culture) - Urine, Clean Catch    Collection Time: 10/06/20  1:55 PM    Specimen: Urine, Clean Catch   Result Value Ref Range    Color, UA Yellow Yellow, Straw    Appearance, UA Clear Clear    pH, UA 5.5 5.0 - 8.0    Specific Gravity, UA >=1.030 1.005 - 1.030    Glucose, UA Negative Negative    Ketones, UA 15 mg/dL (1+) (A) Negative    Bilirubin, UA Negative Negative    Blood, UA Negative Negative    Protein, UA 30 mg/dL (1+) (A) Negative    Leuk Esterase, UA Negative Negative    Nitrite, UA Negative Negative    Urobilinogen, UA 0.2 E.U./dL 0.2 - 1.0 E.U./dL    Urinalysis, Microscopic Only - Urine, Clean Catch    Collection Time: 10/06/20  1:55 PM    Specimen: Urine, Clean Catch   Result Value Ref Range    RBC, UA None Seen None Seen, 0-2 /HPF    WBC, UA 3-5 (A) None Seen, 0-2 /HPF    Bacteria, UA 2+ (A) None Seen /HPF    Squamous Epithelial Cells, UA 21-30 (A) None Seen, 0-2 /HPF    Hyaline Casts, UA 3-6 None Seen /LPF    Methodology Manual Light Microscopy    Respiratory Panel PCR w/COVID-19(SARS-CoV-2) [facility] In-House, NP Swab in UTM/VTM, 3-4 HR TAT - Swab, Nasopharynx    Collection Time: 10/06/20  5:51 PM    Specimen: Nasopharynx; Swab   Result Value Ref Range    ADENOVIRUS, PCR Not Detected Not Detected    Coronavirus 229E Not Detected Not Detected    Coronavirus HKU1 Not Detected Not Detected    Coronavirus NL63 Not Detected Not Detected    Coronavirus OC43 Not Detected Not Detected    COVID19 Not Detected Not Detected - Ref. Range    Human Metapneumovirus Not Detected Not Detected    Human Rhinovirus/Enterovirus Detected (A) Not Detected    Influenza A PCR Not Detected Not Detected    Influenza A H1 Not Detected Not Detected    Influenza A H1 2009 PCR Not Detected Not Detected    Influenza A H3 Not Detected Not Detected    Influenza B PCR Not Detected Not Detected    Parainfluenza Virus 1 Not Detected Not Detected    Parainfluenza Virus 2 Not Detected Not Detected    Parainfluenza Virus 3 Not Detected Not Detected    Parainfluenza Virus 4 Not Detected Not Detected    RSV, PCR Not Detected Not Detected    Bordetella pertussis pcr Not Detected Not Detected    Bordetella parapertussis PCR Not Detected Not Detected    Chlamydophila pneumoniae PCR Not Detected Not Detected    Mycoplasma pneumo by PCR Not Detected Not Detected       Ordered the above labs and independently reviewed the results.        RADIOLOGY  Ct Abdomen Pelvis With Contrast    Result Date: 10/6/2020  CT ABDOMEN PELVIS W CONTRAST-  INDICATIONS: Abdominal pain  TECHNIQUE: Radiation  dose reduction techniques were utilized, including automated exposure control and exposure modulation based on body size. Enhanced ABDOMEN AND PELVIS CT  COMPARISON: None available  FINDINGS:  A partly calcified gallstone, about 3 cm, is seen in the gallbladder.  Otherwise unremarkable appearance of the liver, spleen, adrenal glands, pancreas, kidneys, bladder. Intrauterine device is seen in the uterus.  No bowel obstruction or abnormal bowel thickening is identified. Mild to moderate proximal colonic fecal retention.  No free intraperitoneal gas. Small pelvic free fluid.  Scattered small mesenteric and para-aortic lymph nodes are seen that are not significant by size criteria.  Abdominal aorta is not aneurysmal.  The lung bases are clear.  Endplate changes are seen in the spine. No acute fracture is identified.          1. No acute inflammatory process of bowel is identified. Small pelvic free fluid, nonspecific. Follow-up as indications persist. 2. No obstructive uropathy. 3. Cholelithiasis.   This report was finalized on 10/6/2020 5:27 PM by Dr. Vincent Edmondson M.D.      Us Gallbladder    Result Date: 10/6/2020  US GALLBLADDER-  INDICATIONS: Right upper quadrant pain  TECHNIQUE: Ultrasound of the right upper quadrant  COMPARISON: CT from 10/06/2020  FINDINGS:  The gallbladder is tender and contains a 2.7 cm stone at the neck of the gallbladder, and an additional 1.3 cm mobile gallstone in the gallbladder. Minimal pericholecystic fluid. Some areas of the gallbladder wall appear mildly thickened.  No intrahepatic biliary ductal dilatation is demonstrated. The common biliary duct caliber is mildly prominent, 7 mm, without a specific cause such as stone or lesion identified, although the distal common biliary duct is obscured, MRCP correlation could be obtained if indicated.  No liver lesion is identified. Diffusely, the echogenicity of the liver is otherwise in the range of normal relative to that of the right  kidney.  The pancreas is partly obscured. The right kidney, 12.1 cm, and the pancreas otherwise appear unremarkable.          Cholelithiasis with evidence of acute cholecystitis. Mildly prominent common biliary duct caliber.  Discussed by telephone with Dr. Morrow at 1818, 10/06/2020.  This report was finalized on 10/6/2020 6:17 PM by Dr. Vincent Edmondson M.D.        I ordered the above noted radiological studies. Reviewed by me and discussed with radiologist.  See dictation for official radiology interpretation.      PROCEDURES    Procedures      MEDICATIONS GIVEN IN ER    Medications   sodium chloride 0.9 % flush 10 mL (10 mL Intravenous Not Given 10/6/20 2031)   sodium chloride 0.9 % flush 10 mL (has no administration in time range)   dextrose 5 % and sodium chloride 0.45 % with KCl 20 mEq/L infusion (100 mL/hr Intravenous New Bag 10/6/20 1936)   HYDROmorphone (DILAUDID) injection 0.5 mg (0.5 mg Intravenous Given 10/6/20 2032)     And   naloxone (NARCAN) injection 0.4 mg (has no administration in time range)   ondansetron (ZOFRAN) injection 4 mg (has no administration in time range)   piperacillin-tazobactam (ZOSYN) 3.375 g in iso-osmotic dextrose 50 ml (premix) (3.375 g Intravenous Not Given 10/6/20 1858)   ketorolac (TORADOL) injection 15 mg (15 mg Intravenous Given 10/6/20 1609)   iopamidol (ISOVUE-300) 61 % injection 100 mL (85 mL Intravenous Given by Other 10/6/20 1655)   butorphanol (STADOL) injection 1 mg (1 mg Intravenous Given 10/6/20 1759)   ondansetron (ZOFRAN) injection 4 mg (4 mg Intravenous Given 10/6/20 1758)   metoprolol tartrate (LOPRESSOR) injection 5 mg (5 mg Intravenous Given 10/6/20 1803)   piperacillin-tazobactam (ZOSYN) 3.375 g in iso-osmotic dextrose 50 ml (premix) (0 g Intravenous Stopped 10/6/20 1835)   iopamidol (ISOVUE-300) 61 % injection  - ADS Override Pull (has no administration in time range)         PROGRESS, DATA ANALYSIS, CONSULTS, AND MEDICAL DECISION MAKING    All labs have  been independently reviewed by me.  All radiology studies have been reviewed by me and discussed with radiologist dictating the report.   EKG's independently viewed and interpreted by me.  Discussion below represents my analysis of pertinent findings related to patient's condition, differential diagnosis, treatment plan and final disposition.        ED Course as of Oct 06 2148   Tue Oct 06, 2020   2147 CBC shows a mild leukocytosis, chemistry is unremarkable including liver functions.  Lipase is negative    [DP]   2147 Urinalysis is a clean-catch and appears to be contaminated.  She has no urinary complaints and I suspect this is not an acute infection.    [DP]   2147 CT scan of the abdomen pelvis shows a large gallstone with some inflammatory changes about the gallbladder but the common duct is not well-visualized.    [DP]   2148 Gallbladder ultrasound shows similar findings of a large immovable stone in the neck of the gallbladder with some inflammatory changes surrounding.  Common duct is borderline in size.    [DP]   2148 Patient was given IV Zosyn, and I spoke with Dr. Dean from general surgery who came to evaluate the patient in the ED and agrees to admit her to a [facility] bed for cholecystectomy whenever available.    [DP]   2148 Patient was given IV Toradol and Stadol in the ED for her pain which seemed to help    [DP]      ED Course User Index  [DP] Jose Morrow MD           PPE: Both the patient and I wore a surgical mask throughout the entire patient encounter. I wore protective goggles.     AS OF 21:48 EDT VITALS:    BP - 143/98  HR - 90  TEMP - 97.4 °F (36.3 °C) (Oral)  O2 SATS - 100%        DIAGNOSIS  Final diagnoses:   Acute cholecystitis         DISPOSITION  Admit to [facility]           Jose Morrow MD  10/06/20 2148

## 2020-10-07 ENCOUNTER — ANESTHESIA EVENT (OUTPATIENT)
Dept: PERIOP | Facility: HOSPITAL | Age: 35
End: 2020-10-07

## 2020-10-07 ENCOUNTER — ANESTHESIA (OUTPATIENT)
Dept: PERIOP | Facility: HOSPITAL | Age: 35
End: 2020-10-07

## 2020-10-07 ENCOUNTER — APPOINTMENT (OUTPATIENT)
Dept: GENERAL RADIOLOGY | Facility: HOSPITAL | Age: 35
End: 2020-10-07

## 2020-10-07 LAB — B-HCG UR QL: NEGATIVE

## 2020-10-07 PROCEDURE — 25010000002 PROPOFOL 10 MG/ML EMULSION: Performed by: NURSE ANESTHETIST, CERTIFIED REGISTERED

## 2020-10-07 PROCEDURE — 88304 TISSUE EXAM BY PATHOLOGIST: CPT | Performed by: SURGERY

## 2020-10-07 PROCEDURE — 47563 LAPARO CHOLECYSTECTOMY/GRAPH: CPT | Performed by: PHYSICIAN ASSISTANT

## 2020-10-07 PROCEDURE — 25010000002 MIDAZOLAM PER 1 MG: Performed by: ANESTHESIOLOGY

## 2020-10-07 PROCEDURE — 25010000002 NEOSTIGMINE PER 0.5 MG: Performed by: NURSE ANESTHETIST, CERTIFIED REGISTERED

## 2020-10-07 PROCEDURE — 25010000002 HYDROMORPHONE PER 4 MG: Performed by: SURGERY

## 2020-10-07 PROCEDURE — 25010000002 FENTANYL CITRATE (PF) 100 MCG/2ML SOLUTION: Performed by: NURSE ANESTHETIST, CERTIFIED REGISTERED

## 2020-10-07 PROCEDURE — 25010000002 ONDANSETRON PER 1 MG: Performed by: NURSE ANESTHETIST, CERTIFIED REGISTERED

## 2020-10-07 PROCEDURE — 25010000002 PIPERACILLIN SOD-TAZOBACTAM PER 1 G: Performed by: SURGERY

## 2020-10-07 PROCEDURE — 25010000002 DEXAMETHASONE PER 1 MG: Performed by: NURSE ANESTHETIST, CERTIFIED REGISTERED

## 2020-10-07 PROCEDURE — 25010000002 PIPERACILLIN SOD-TAZOBACTAM PER 1 G: Performed by: NURSE ANESTHETIST, CERTIFIED REGISTERED

## 2020-10-07 PROCEDURE — 25010000002 ONDANSETRON PER 1 MG: Performed by: SURGERY

## 2020-10-07 PROCEDURE — 25010000002 HYDROMORPHONE PER 4 MG: Performed by: NURSE ANESTHETIST, CERTIFIED REGISTERED

## 2020-10-07 PROCEDURE — 25010000002 FENTANYL CITRATE (PF) 100 MCG/2ML SOLUTION: Performed by: ANESTHESIOLOGY

## 2020-10-07 PROCEDURE — 0FT44ZZ RESECTION OF GALLBLADDER, PERCUTANEOUS ENDOSCOPIC APPROACH: ICD-10-PCS | Performed by: SURGERY

## 2020-10-07 PROCEDURE — 0 IOTHALAMATE 60 % SOLUTION: Performed by: SURGERY

## 2020-10-07 PROCEDURE — 74300 X-RAY BILE DUCTS/PANCREAS: CPT

## 2020-10-07 PROCEDURE — 25010000002 BUTORPHANOL PER 1 MG: Performed by: SURGERY

## 2020-10-07 PROCEDURE — 47563 LAPARO CHOLECYSTECTOMY/GRAPH: CPT | Performed by: SURGERY

## 2020-10-07 PROCEDURE — 25010000002 PHENYLEPHRINE PER 1 ML: Performed by: NURSE ANESTHETIST, CERTIFIED REGISTERED

## 2020-10-07 DEVICE — CLIP LIGAT VASC HORIZON TI MD/LG GRN 6CT: Type: IMPLANTABLE DEVICE | Site: ABDOMEN | Status: FUNCTIONAL

## 2020-10-07 RX ORDER — HYDROCODONE BITARTRATE AND ACETAMINOPHEN 5; 325 MG/1; MG/1
TABLET ORAL
Qty: 24 TABLET | Refills: 0 | Status: SHIPPED | OUTPATIENT
Start: 2020-10-07 | End: 2020-10-15

## 2020-10-07 RX ORDER — EPHEDRINE SULFATE 50 MG/ML
5 INJECTION, SOLUTION INTRAVENOUS ONCE AS NEEDED
Status: DISCONTINUED | OUTPATIENT
Start: 2020-10-07 | End: 2020-10-07 | Stop reason: HOSPADM

## 2020-10-07 RX ORDER — LABETALOL HYDROCHLORIDE 5 MG/ML
5 INJECTION, SOLUTION INTRAVENOUS
Status: DISCONTINUED | OUTPATIENT
Start: 2020-10-07 | End: 2020-10-07 | Stop reason: HOSPADM

## 2020-10-07 RX ORDER — LIDOCAINE HYDROCHLORIDE 10 MG/ML
0.5 INJECTION, SOLUTION EPIDURAL; INFILTRATION; INTRACAUDAL; PERINEURAL ONCE AS NEEDED
Status: DISCONTINUED | OUTPATIENT
Start: 2020-10-07 | End: 2020-10-07 | Stop reason: HOSPADM

## 2020-10-07 RX ORDER — GLYCOPYRROLATE 0.2 MG/ML
INJECTION INTRAMUSCULAR; INTRAVENOUS AS NEEDED
Status: DISCONTINUED | OUTPATIENT
Start: 2020-10-07 | End: 2020-10-07 | Stop reason: SURG

## 2020-10-07 RX ORDER — PROMETHAZINE HYDROCHLORIDE 25 MG/1
25 TABLET ORAL ONCE AS NEEDED
Status: DISCONTINUED | OUTPATIENT
Start: 2020-10-07 | End: 2020-10-07 | Stop reason: HOSPADM

## 2020-10-07 RX ORDER — SODIUM CHLORIDE, SODIUM LACTATE, POTASSIUM CHLORIDE, CALCIUM CHLORIDE 600; 310; 30; 20 MG/100ML; MG/100ML; MG/100ML; MG/100ML
9 INJECTION, SOLUTION INTRAVENOUS CONTINUOUS
Status: DISCONTINUED | OUTPATIENT
Start: 2020-10-07 | End: 2020-10-07

## 2020-10-07 RX ORDER — ROCURONIUM BROMIDE 10 MG/ML
INJECTION, SOLUTION INTRAVENOUS AS NEEDED
Status: DISCONTINUED | OUTPATIENT
Start: 2020-10-07 | End: 2020-10-07 | Stop reason: SURG

## 2020-10-07 RX ORDER — ONDANSETRON 4 MG/1
4 TABLET, FILM COATED ORAL EVERY 6 HOURS PRN
Qty: 10 TABLET | Refills: 1 | Status: SHIPPED | OUTPATIENT
Start: 2020-10-07 | End: 2020-10-15

## 2020-10-07 RX ORDER — LIDOCAINE HYDROCHLORIDE 20 MG/ML
INJECTION, SOLUTION INFILTRATION; PERINEURAL AS NEEDED
Status: DISCONTINUED | OUTPATIENT
Start: 2020-10-07 | End: 2020-10-07 | Stop reason: SURG

## 2020-10-07 RX ORDER — HYDROCODONE BITARTRATE AND ACETAMINOPHEN 5; 325 MG/1; MG/1
1 TABLET ORAL EVERY 4 HOURS PRN
Status: DISCONTINUED | OUTPATIENT
Start: 2020-10-07 | End: 2020-10-08 | Stop reason: HOSPADM

## 2020-10-07 RX ORDER — PROMETHAZINE HYDROCHLORIDE 25 MG/1
25 SUPPOSITORY RECTAL ONCE AS NEEDED
Status: DISCONTINUED | OUTPATIENT
Start: 2020-10-07 | End: 2020-10-07 | Stop reason: HOSPADM

## 2020-10-07 RX ORDER — OXYCODONE AND ACETAMINOPHEN 7.5; 325 MG/1; MG/1
1 TABLET ORAL ONCE AS NEEDED
Status: DISCONTINUED | OUTPATIENT
Start: 2020-10-07 | End: 2020-10-07 | Stop reason: HOSPADM

## 2020-10-07 RX ORDER — HYDROMORPHONE HYDROCHLORIDE 1 MG/ML
0.5 INJECTION, SOLUTION INTRAMUSCULAR; INTRAVENOUS; SUBCUTANEOUS
Status: DISCONTINUED | OUTPATIENT
Start: 2020-10-07 | End: 2020-10-07 | Stop reason: HOSPADM

## 2020-10-07 RX ORDER — FENTANYL CITRATE 50 UG/ML
50 INJECTION, SOLUTION INTRAMUSCULAR; INTRAVENOUS
Status: DISCONTINUED | OUTPATIENT
Start: 2020-10-07 | End: 2020-10-07 | Stop reason: HOSPADM

## 2020-10-07 RX ORDER — ONDANSETRON 2 MG/ML
4 INJECTION INTRAMUSCULAR; INTRAVENOUS ONCE AS NEEDED
Status: DISCONTINUED | OUTPATIENT
Start: 2020-10-07 | End: 2020-10-07 | Stop reason: HOSPADM

## 2020-10-07 RX ORDER — AMOXICILLIN AND CLAVULANATE POTASSIUM 875; 125 MG/1; MG/1
1 TABLET, FILM COATED ORAL 2 TIMES DAILY
Qty: 10 TABLET | Refills: 0 | Status: SHIPPED | OUTPATIENT
Start: 2020-10-07 | End: 2020-10-12

## 2020-10-07 RX ORDER — FLUMAZENIL 0.1 MG/ML
0.2 INJECTION INTRAVENOUS AS NEEDED
Status: DISCONTINUED | OUTPATIENT
Start: 2020-10-07 | End: 2020-10-07 | Stop reason: HOSPADM

## 2020-10-07 RX ORDER — HYDROCODONE BITARTRATE AND ACETAMINOPHEN 7.5; 325 MG/1; MG/1
1 TABLET ORAL ONCE AS NEEDED
Status: DISCONTINUED | OUTPATIENT
Start: 2020-10-07 | End: 2020-10-07 | Stop reason: HOSPADM

## 2020-10-07 RX ORDER — DIPHENHYDRAMINE HYDROCHLORIDE 50 MG/ML
12.5 INJECTION INTRAMUSCULAR; INTRAVENOUS
Status: DISCONTINUED | OUTPATIENT
Start: 2020-10-07 | End: 2020-10-07 | Stop reason: HOSPADM

## 2020-10-07 RX ORDER — DEXAMETHASONE SODIUM PHOSPHATE 10 MG/ML
INJECTION INTRAMUSCULAR; INTRAVENOUS AS NEEDED
Status: DISCONTINUED | OUTPATIENT
Start: 2020-10-07 | End: 2020-10-07 | Stop reason: SURG

## 2020-10-07 RX ORDER — ESMOLOL HYDROCHLORIDE 10 MG/ML
INJECTION, SOLUTION INTRAVENOUS CONTINUOUS PRN
Status: DISCONTINUED | OUTPATIENT
Start: 2020-10-07 | End: 2020-10-07

## 2020-10-07 RX ORDER — NALOXONE HCL 0.4 MG/ML
0.2 VIAL (ML) INJECTION AS NEEDED
Status: DISCONTINUED | OUTPATIENT
Start: 2020-10-07 | End: 2020-10-07 | Stop reason: HOSPADM

## 2020-10-07 RX ORDER — DIPHENHYDRAMINE HCL 25 MG
25 CAPSULE ORAL
Status: DISCONTINUED | OUTPATIENT
Start: 2020-10-07 | End: 2020-10-07 | Stop reason: HOSPADM

## 2020-10-07 RX ORDER — ESMOLOL HYDROCHLORIDE 20 MG/ML
INJECTION, SOLUTION INTRAVENOUS CONTINUOUS PRN
Status: DISCONTINUED | OUTPATIENT
Start: 2020-10-07 | End: 2020-10-07

## 2020-10-07 RX ORDER — FENTANYL CITRATE 50 UG/ML
INJECTION, SOLUTION INTRAMUSCULAR; INTRAVENOUS AS NEEDED
Status: DISCONTINUED | OUTPATIENT
Start: 2020-10-07 | End: 2020-10-07 | Stop reason: SURG

## 2020-10-07 RX ORDER — SODIUM CHLORIDE 0.9 % (FLUSH) 0.9 %
3-10 SYRINGE (ML) INJECTION AS NEEDED
Status: DISCONTINUED | OUTPATIENT
Start: 2020-10-07 | End: 2020-10-07 | Stop reason: HOSPADM

## 2020-10-07 RX ORDER — HYDROCODONE BITARTRATE AND ACETAMINOPHEN 5; 325 MG/1; MG/1
2 TABLET ORAL EVERY 4 HOURS PRN
Status: DISCONTINUED | OUTPATIENT
Start: 2020-10-07 | End: 2020-10-08 | Stop reason: HOSPADM

## 2020-10-07 RX ORDER — ONDANSETRON 2 MG/ML
INJECTION INTRAMUSCULAR; INTRAVENOUS AS NEEDED
Status: DISCONTINUED | OUTPATIENT
Start: 2020-10-07 | End: 2020-10-07 | Stop reason: SURG

## 2020-10-07 RX ORDER — MIDAZOLAM HYDROCHLORIDE 1 MG/ML
1 INJECTION INTRAMUSCULAR; INTRAVENOUS
Status: DISCONTINUED | OUTPATIENT
Start: 2020-10-07 | End: 2020-10-07 | Stop reason: HOSPADM

## 2020-10-07 RX ORDER — FAMOTIDINE 10 MG/ML
20 INJECTION, SOLUTION INTRAVENOUS ONCE
Status: COMPLETED | OUTPATIENT
Start: 2020-10-07 | End: 2020-10-07

## 2020-10-07 RX ORDER — PROPOFOL 10 MG/ML
VIAL (ML) INTRAVENOUS AS NEEDED
Status: DISCONTINUED | OUTPATIENT
Start: 2020-10-07 | End: 2020-10-07 | Stop reason: SURG

## 2020-10-07 RX ORDER — MAGNESIUM HYDROXIDE 1200 MG/15ML
LIQUID ORAL AS NEEDED
Status: DISCONTINUED | OUTPATIENT
Start: 2020-10-07 | End: 2020-10-07 | Stop reason: HOSPADM

## 2020-10-07 RX ORDER — BUPIVACAINE HYDROCHLORIDE AND EPINEPHRINE 5; 5 MG/ML; UG/ML
INJECTION, SOLUTION PERINEURAL AS NEEDED
Status: DISCONTINUED | OUTPATIENT
Start: 2020-10-07 | End: 2020-10-07 | Stop reason: HOSPADM

## 2020-10-07 RX ORDER — SODIUM CHLORIDE 0.9 % (FLUSH) 0.9 %
3 SYRINGE (ML) INJECTION EVERY 12 HOURS SCHEDULED
Status: DISCONTINUED | OUTPATIENT
Start: 2020-10-07 | End: 2020-10-07 | Stop reason: HOSPADM

## 2020-10-07 RX ADMIN — SODIUM CHLORIDE, POTASSIUM CHLORIDE, SODIUM LACTATE AND CALCIUM CHLORIDE: 600; 310; 30; 20 INJECTION, SOLUTION INTRAVENOUS at 14:31

## 2020-10-07 RX ADMIN — HYDROCODONE BITARTRATE AND ACETAMINOPHEN 2 TABLET: 5; 325 TABLET ORAL at 23:05

## 2020-10-07 RX ADMIN — TAZOBACTAM SODIUM AND PIPERACILLIN SODIUM 3.38 G: 375; 3 INJECTION, SOLUTION INTRAVENOUS at 08:03

## 2020-10-07 RX ADMIN — ROCURONIUM BROMIDE 40 MG: 10 INJECTION INTRAVENOUS at 13:35

## 2020-10-07 RX ADMIN — MIDAZOLAM 1 MG: 1 INJECTION INTRAMUSCULAR; INTRAVENOUS at 13:00

## 2020-10-07 RX ADMIN — TAZOBACTAM SODIUM AND PIPERACILLIN SODIUM 3.38 G: 375; 3 INJECTION, SOLUTION INTRAVENOUS at 13:51

## 2020-10-07 RX ADMIN — FENTANYL CITRATE 50 MCG: 50 INJECTION, SOLUTION INTRAMUSCULAR; INTRAVENOUS at 13:00

## 2020-10-07 RX ADMIN — GLYCOPYRROLATE 0.2 MG: 0.2 INJECTION INTRAMUSCULAR; INTRAVENOUS at 14:31

## 2020-10-07 RX ADMIN — BUTORPHANOL TARTRATE 1 MG: 2 INJECTION, SOLUTION INTRAMUSCULAR; INTRAVENOUS at 10:22

## 2020-10-07 RX ADMIN — FENTANYL CITRATE 50 MCG: 50 INJECTION, SOLUTION INTRAMUSCULAR; INTRAVENOUS at 15:30

## 2020-10-07 RX ADMIN — HYDROMORPHONE HYDROCHLORIDE 0.5 MG: 1 INJECTION, SOLUTION INTRAMUSCULAR; INTRAVENOUS; SUBCUTANEOUS at 02:16

## 2020-10-07 RX ADMIN — FAMOTIDINE 20 MG: 10 INJECTION, SOLUTION INTRAVENOUS at 13:01

## 2020-10-07 RX ADMIN — BUTORPHANOL TARTRATE 1 MG: 2 INJECTION, SOLUTION INTRAMUSCULAR; INTRAVENOUS at 03:57

## 2020-10-07 RX ADMIN — HYDROMORPHONE HYDROCHLORIDE 0.5 MG: 1 INJECTION, SOLUTION INTRAMUSCULAR; INTRAVENOUS; SUBCUTANEOUS at 15:17

## 2020-10-07 RX ADMIN — HYDROMORPHONE HYDROCHLORIDE 0.5 MG: 1 INJECTION, SOLUTION INTRAMUSCULAR; INTRAVENOUS; SUBCUTANEOUS at 11:34

## 2020-10-07 RX ADMIN — PHENYLEPHRINE HYDROCHLORIDE 100 MCG: 10 INJECTION INTRAVENOUS at 13:56

## 2020-10-07 RX ADMIN — TAZOBACTAM SODIUM AND PIPERACILLIN SODIUM 3.38 G: 375; 3 INJECTION, SOLUTION INTRAVENOUS at 17:47

## 2020-10-07 RX ADMIN — BUTORPHANOL TARTRATE 1 MG: 2 INJECTION, SOLUTION INTRAMUSCULAR; INTRAVENOUS at 17:46

## 2020-10-07 RX ADMIN — BUTORPHANOL TARTRATE 1 MG: 2 INJECTION, SOLUTION INTRAMUSCULAR; INTRAVENOUS at 21:11

## 2020-10-07 RX ADMIN — SODIUM CHLORIDE, POTASSIUM CHLORIDE, SODIUM LACTATE AND CALCIUM CHLORIDE 9 ML/HR: 600; 310; 30; 20 INJECTION, SOLUTION INTRAVENOUS at 13:01

## 2020-10-07 RX ADMIN — LIDOCAINE HYDROCHLORIDE 60 MG: 20 INJECTION, SOLUTION INFILTRATION; PERINEURAL at 13:35

## 2020-10-07 RX ADMIN — BUTORPHANOL TARTRATE 1 MG: 2 INJECTION, SOLUTION INTRAMUSCULAR; INTRAVENOUS at 07:16

## 2020-10-07 RX ADMIN — FENTANYL CITRATE 100 MCG: 50 INJECTION INTRAMUSCULAR; INTRAVENOUS at 13:35

## 2020-10-07 RX ADMIN — PROPOFOL 200 MG: 10 INJECTION, EMULSION INTRAVENOUS at 13:35

## 2020-10-07 RX ADMIN — ONDANSETRON 4 MG: 2 INJECTION INTRAMUSCULAR; INTRAVENOUS at 08:03

## 2020-10-07 RX ADMIN — HYDROMORPHONE HYDROCHLORIDE 0.5 MG: 1 INJECTION, SOLUTION INTRAMUSCULAR; INTRAVENOUS; SUBCUTANEOUS at 09:01

## 2020-10-07 RX ADMIN — ONDANSETRON HYDROCHLORIDE 4 MG: 2 SOLUTION INTRAMUSCULAR; INTRAVENOUS at 14:15

## 2020-10-07 RX ADMIN — HYDROCODONE BITARTRATE AND ACETAMINOPHEN 2 TABLET: 5; 325 TABLET ORAL at 18:15

## 2020-10-07 RX ADMIN — POTASSIUM CHLORIDE, DEXTROSE MONOHYDRATE AND SODIUM CHLORIDE 100 ML/HR: 150; 5; 450 INJECTION, SOLUTION INTRAVENOUS at 05:31

## 2020-10-07 RX ADMIN — NEOSTIGMINE METHYLSULFATE 2 MG: 1 INJECTION INTRAMUSCULAR; INTRAVENOUS; SUBCUTANEOUS at 14:31

## 2020-10-07 RX ADMIN — SODIUM CHLORIDE, PRESERVATIVE FREE 10 ML: 5 INJECTION INTRAVENOUS at 21:11

## 2020-10-07 RX ADMIN — HYDROMORPHONE HYDROCHLORIDE 0.5 MG: 1 INJECTION, SOLUTION INTRAMUSCULAR; INTRAVENOUS; SUBCUTANEOUS at 05:31

## 2020-10-07 RX ADMIN — DEXAMETHASONE SODIUM PHOSPHATE 6 MG: 10 INJECTION INTRAMUSCULAR; INTRAVENOUS at 13:50

## 2020-10-07 NOTE — PLAN OF CARE
Goal Outcome Evaluation:  Plan of Care Reviewed With: patient  Progress: no change  Outcome Summary: ivf, med for pain also called md for additional pain med, npo, droplet isolation

## 2020-10-07 NOTE — OP NOTE
PREOPERATIVE DIAGNOSIS:  Cholelithiasis, acute cholecystitis    POSTOPERATIVE DIAGNOSIS (FINDINGS):  Cholelithiasis, acute cholecystitis, hydrops    PROCEDURE:  Laparoscopic cholecystectomy with cholangiogram    SURGEON:  Donnie Dean MD    ASSISTANT:  Marv Nixon    ANESTHESIA:  General    EBL:  Minimal    SPECIMEN(S):  Gallbladder    DESCRIPTION:  Supine position. General anesthesia. Prepped and draped, usual sterile manner.    1/2 % marcaine with epinephrine infiltrated in all incision sites. Small periumbilical incision made, Veress needle inserted with upward traction on abdominal wall. Abdomen insufflated to 15 mm Hg pressure and 5 mm opti-view trocar inserted followed by 10 mm subxiphoid and two 5 mm right subcostal trocars.    Gallbladder was thickened and inflamed and distended.  Gallbladder was aspirated revealing clear fluid consistent with hydrops.  Gallbladder was then grasped and elevated. Cystic duct dissected and clipped once distally. Cholangiogram catheter inserted and cholangiogram demonstrated prompt filling of duodenum, no filling defects. Cystic duct clipped twice proximally and divided. Cystic artery clipped twice proximally, once distally and divided. Gallbladder removed from the liver bed with electrocautery and removed through subxiphoid trocar site. Liver bed copiously irrigated and aspirated with good hemostasis noted. CO2 released, fascia of subxiphoid trocar site closed with 0-vicryl, skin edges 5-0 vicryl subcuticular suture.    Tolerated well.  Stable to PACU.    Donnie Dean M.D.

## 2020-10-07 NOTE — PROGRESS NOTES
Discharge Planning Assessment  [facility]     Patient Name: Magy Quach  MRN: [id number]  Today's Date: 10/7/2020    Admit Date: 10/6/2020    Discharge Needs Assessment     Row Name 10/07/20 1200       Living Environment    Lives With  spouse;child(letitia), dependent;other relative(s)    Name(s) of Who Lives With Patient  Spouse: Paul Quach, 2 children and 2 nephews (ages 7, 10, 15 & 15)    Current Living Arrangements  home/apartment/condo    Primary Care Provided by  self    Provides Primary Care For  no one    Family Caregiver if Needed  spouse    Quality of Family Relationships  helpful;involved;supportive    Able to Return to Prior Arrangements  yes       Resource/Environmental Concerns    Resource/Environmental Concerns  none       Transition Planning    Patient/Family Anticipates Transition to  home with family    Transportation Anticipated  family or friend will provide       Discharge Needs Assessment    Equipment Currently Used at Home  none    Concerns to be Addressed  denies needs/concerns at this time    Provided Post Acute Provider List?  N/A    N/A Provider List Comment  Denies needs. Plan is home        Discharge Plan     Row Name 10/07/20 1202       Plan    Plan  Home (FU after OR today)    Patient/Family in Agreement with Plan  yes    Plan Comments  Introduced self and role of CCP. Facesheet verified. Patient lives with spouse, Paul Quach, 2 children and 2 nephews (ages 7, 10, 15 &15) in a 2 story house with a basement. There are 2 steps to enter through the front. Master bedroom, master bath and laundry are located on the main level.  Prior to admission, patient was independent with ADL's, worked full time and drives. Uses no DMEs. Has never used a HH agency nor been to a rehab facilty.  DC plan is to return home.  Denies any needs/equipment. Surgery is scheduled for 1 o'clock today. Will FU on poss needs.        Continued Care and Services - Admitted Since 10/6/2020    Coordination has not  been started for this encounter.         Demographic Summary     Row Name 10/07/20 1159       General Information    Admission Type  inpatient    Arrived From  home    Reason for Consult  discharge planning    Preferred Language  English     Used During This Interaction  no        Functional Status     Row Name 10/07/20 1159       Functional Status    Usual Activity Tolerance  good    Current Activity Tolerance  good       Functional Status, IADL    Medications  independent    Meal Preparation  independent    Housekeeping  independent    Laundry  independent    Shopping  independent       Mental Status    General Appearance WDL  WDL       Employment/    Employment Status  employed full-time        Psychosocial     Row Name 10/07/20 1200       Values/Beliefs    Spiritual, Cultural Beliefs, [employer]ism Practices, Values that Affect Care  no       Behavior WDL    Behavior WDL  WDL       Emotion Mood WDL    Emotion/Mood/Affect WDL  WDL       Speech WDL    Speech WDL  WDL       Perceptual State WDL    Perceptual State WDL  WDL       Coping/Stress    Sources of Support  spouse    Reaction to Health Status  adjusting    Understanding of Condition and Treatment  adequate understanding of treatment       Developmental Stage (Fernandoiksson's)    Developmental Stage  Stage 6 (18-35 years/Young Adulthood) Intimacy vs. Isolation        Abuse/Neglect     Row Name 10/07/20 1200       Personal Safety    Feels Unsafe at Home or Work/School  no    Feels Threatened by Someone  no    Does Anyone Try to Keep You From Having Contact with Others or Doing Things Outside Your Home?  no    Physical Signs of Abuse Present  no          Deena Smith, RN

## 2020-10-07 NOTE — ANESTHESIA PROCEDURE NOTES
Airway  Urgency: elective    Date/Time: 10/7/2020 1:36 PM  Airway not difficult    General Information and Staff    Patient location during procedure: OR  Anesthesiologist: Iam Connors MD  CRNA: Cecilio Rehman CRNA    Indications and Patient Condition  Indications for airway management: airway protection    Preoxygenated: yes  MILS maintained throughout  Mask difficulty assessment: 1 - vent by mask    Final Airway Details  Final airway type: endotracheal airway      Successful airway: ETT  Cuffed: yes   Successful intubation technique: direct laryngoscopy  Endotracheal tube insertion site: oral  Blade: Tamiko  Blade size: 3  ETT size (mm): 7.0  Cormack-Lehane Classification: grade I - full view of glottis  Placement verified by: chest auscultation and capnometry   Measured from: lips  ETT/EBT  to lips (cm): 20  Number of attempts at approach: 1  Assessment: lips, teeth, and gum same as pre-op and atraumatic intubation

## 2020-10-07 NOTE — PAYOR COMM NOTE
"Magy Quach (35 y.o. Female)     INITIAL CLINICAL FOR [id number]    CONTACT SKYE MITCHELL# [phone]  F# [phone]          Date of Birth Social Security Number Address Home Phone MRN    1985  [id number] [facility] [phone] [id number]    Anabaptism Marital Status          Gnosticism        Admission Date Admission Type Admitting Provider Attending Provider Department, Room/Bed    10/6/20 Emergency Donnie Dean MD Pokorny, Richard, MD [facility] MAIN OR, ANNEL[facility] Main OR/MAIN OR    Discharge Date Discharge Disposition Discharge Destination                       Attending Provider: Donnie Dean MD    Allergies: No Known Allergies    Isolation: Droplet   Infection: Rhinovirus  (10/06/20)   Code Status: CPR    Ht: 160 cm (63\")   Wt: 75.2 kg (165 lb 11.2 oz)    Admission Cmt: None   Principal Problem: None                Active Insurance as of 10/6/2020     Primary Coverage     Payor Plan Insurance Group Employer/Plan Group    [employer] BLUE CROSS AN[employer]M BLUE CROSS BLUE SHIELD PPO [id number]     Payor Plan Address Payor Plan Phone Number Payor Plan Fax Number Effective Dates    [address] [phone]  9/18/2016 - None Entered    [facility] [id number]       Subscriber Name Subscriber Birth Date Member ID       PAUL QUACH [birth year]1984 [id number]                 Emergency Contacts      (Rel.) Home Phone Work Phone Mobile Phone    Paul Quach (Spouse) [phone] -- --               History & Physical      Donnie Dean MD at 10/06/20 5454        SUMMARY (A/P):    35-year-old lady with typical presentation for biliary colic and gallstones on CT and ultrasound.  Due to ongoing pain warrants admission to hospital.  Will start her on intravenous Dilaudid as needed for pain.  Intravenous Zosyn for possible cholecystitis evidenced by persistent tenderness on exam and elevated white blood cell count.  Intravenous Zofran for nausea.  Plan laparoscopic " cholecystectomy with cholangiogram tomorrow.  She understands the options and wishes to proceed as outlined.  She understands nature of the procedure and the risks including but not limited to bleeding, infection, conversion to open procedure, postoperative bile leak, and the bowel function changes which can accompany cholecystectomy.      CC:    Abdominal pain    HPI:    35-year-old lady with 4-day history of progressive moderately severe epigastric and right upper quadrant abdominal pain associated with nausea.  No fever or chills.    RADIOLOGY/ENDOSCOPY:    • CT abdomen pelvis 10/6/2020: Demonstrates cholelithiasis with one 3 cm calcified gallstone.  I reviewed the images and concur.    LABS:    • CMP normal  • Lipase 12  • WBC 13.4    PHYSICAL EXAM:   • Constitutional: Well-developed well-nourished, no acute distress  • Vital signs: /115, HR 83, RR 18, T 98.5, weight 158 pounds, height 63 inches, BMI 28  • Eyes: Conjunctiva normal, sclera nonicteric  • ENMT: Hearing grossly normal, oral mucosa moist  • Neck: Supple, no palpable mass, trachea midline  • Respiratory: Normal inspiratory effort  • Cardiovascular: Regular rate, no jugular venous distention, no peripheral edema  • Gastrointestinal: Soft, nontender, no palpable mass, no hepatosplenomegaly  • Lymphatics (palpable nodes):  cervical-negative, inguinal-negative  • Skin:  Warm, dry, no rash on visualized skin surfaces  • Musculoskeletal: Symmetric strength, no asymmetric muscular atrophy  • Psychiatric: Alert and oriented ×3, normal affect     ALLERGIES:   • None    MEDICATIONS:   • None    PMH:    • Unremarkable    PSH:    • None    FAMILY HISTORY:    • Negative for gallbladder disease    SOCIAL HISTORY:   • Denies tobacco use  • Rare alcohol use    ROS:    Influenza Like Illness: no fever, no  cough, no  sore throat, no  body aches, no loss of sense of taste or smell, no known exposure to person with Covid-19.  All other systems reviewed and  negative other than presenting complaints.    ALYX DEAN M.D.      Electronically signed by Alyx Dean MD at 10/06/20 1821          Emergency Department Notes      Sriram Maldonado RN at 10/06/20 1253        .Pt masked on arrival, RN wearing mask/goggles during encounter    Pt reports abd pain starting Sunday night, concerned for GB issue    Electronically signed by Sriram Maldonado RN at 10/06/20 1253     Jose Morrow MD at 10/06/20 1537           EMERGENCY DEPARTMENT ENCOUNTER    Room Number:  P676/1  Date of encounter:  10/6/2020  PCP: Pepper Crawford MD  Historian: Patient      HPI:  Chief Complaint: Abdominal pain  A complete HPI/ROS/PMH/PSH/SH/FH are unobtainable due to: Nothing    Context: Magy Quach is a 35 y.o. female who presents to the ED c/o severe abdominal pain which began last night.  The pain is localized in the right upper quadrant and radiates to the right shoulder.  The pain is sharp and stabbing, and initially was episodic.  Since last night, it is been constant, worsening, and nothing makes it better or worse.  She has nausea but no vomiting.  She has had no fever that she is aware of.  She feels very bloated but is not had a bowel movement.    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  She is an acquaintance of Dr. Jensen and has an appointment with him next week but the pain began to worsen so she came to the ED.  She is otherwise healthy.      PAST MEDICAL HISTORY  Active Ambulatory Problems     Diagnosis Date Noted   • No Active Ambulatory Problems     Resolved Ambulatory Problems     Diagnosis Date Noted   • No Resolved Ambulatory Problems     Past Medical History:   Diagnosis Date   • Hypertension    • Strep throat          PAST SURGICAL HISTORY  No past surgical history on file.      FAMILY HISTORY  Family History   Problem Relation Age of Onset   • COPD Mother    • Diabetes  Father    • COPD Paternal Grandmother          SOCIAL HISTORY  Social History     Socioeconomic History   • Marital status:      Spouse name: Not on file   • Number of children: Not on file   • Years of education: Not on file   • Highest education level: Not on file   Tobacco Use   • Smoking status: Never Smoker   Substance and Sexual Activity   • Alcohol use: Yes     Comment: occasional   • Drug use: No         ALLERGIES  Patient has no known allergies.        REVIEW OF SYSTEMS  Review of Systems     All systems reviewed and negative except for those discussed in HPI.       PHYSICAL EXAM    I have reviewed the triage vital signs and nursing notes.    ED Triage Vitals   Temp Heart Rate Resp BP SpO2   10/06/20 1253 10/06/20 1253 10/06/20 1253 10/06/20 1333 10/06/20 1253   98.5 °F (36.9 °C) (!) 133 18 (!) 148/103 100 %      Temp src Heart Rate Source Patient Position BP Location FiO2 (%)   -- -- -- -- --              Physical Exam  GENERAL: Awake and alert, moderate distress  HENT: nares patent  EYES: no scleral icterus  CV: Sinus tachycardia  RESPIRATORY: normal effort  ABDOMEN: soft, moderate tenderness in the right upper quadrant with voluntary guarding but no rebound.  Bowel sounds are present  MUSCULOSKELETAL: no deformity  NEURO: alert, moves all extremities, follows commands  SKIN: warm, dry        LAB RESULTS  Recent Results (from the past 24 hour(s))   Comprehensive Metabolic Panel    Collection Time: 10/06/20  1:54 PM    Specimen: Blood   Result Value Ref Range    Glucose 87 65 - 99 mg/dL    BUN 15 6 - 20 mg/dL    Creatinine 0.96 0.57 - 1.00 mg/dL    Sodium 140 136 - 145 mmol/L    Potassium 4.5 3.5 - 5.2 mmol/L    Chloride 99 98 - 107 mmol/L    CO2 28.8 22.0 - 29.0 mmol/L    Calcium 9.8 8.6 - 10.5 mg/dL    Total Protein 8.1 6.0 - 8.5 g/dL    Albumin 5.00 3.50 - 5.20 g/dL    ALT (SGPT) 29 1 - 33 U/L    AST (SGOT) 23 1 - 32 U/L    Alkaline Phosphatase 96 39 - 117 U/L    Total Bilirubin 0.2 0.0 - 1.2  mg/dL    eGFR Non African Amer 67 >60 mL/min/1.73    Globulin 3.1 gm/dL    A/G Ratio 1.6 g/dL    BUN/Creatinine Ratio 15.6 7.0 - 25.0    Anion Gap 12.2 5.0 - 15.0 mmol/L   Lipase    Collection Time: 10/06/20  1:54 PM    Specimen: Blood   Result Value Ref Range    Lipase 12 (L) 13 - 60 U/L   hCG, Serum, Qualitative    Collection Time: 10/06/20  1:54 PM    Specimen: Blood   Result Value Ref Range    HCG Qualitative Negative Negative   Light Blue Top    Collection Time: 10/06/20  1:54 PM   Result Value Ref Range    Extra Tube hold for add-on    Green Top (Gel)    Collection Time: 10/06/20  1:54 PM   Result Value Ref Range    Extra Tube Hold for add-ons.    Lavender Top    Collection Time: 10/06/20  1:54 PM   Result Value Ref Range    Extra Tube hold for add-on    CBC Auto Differential    Collection Time: 10/06/20  1:54 PM    Specimen: Blood   Result Value Ref Range    WBC 13.43 (H) 3.40 - 10.80 10*3/mm3    RBC 5.30 (H) 3.77 - 5.28 10*6/mm3    Hemoglobin 13.2 12.0 - 15.9 g/dL    Hematocrit 39.5 34.0 - 46.6 %    MCV 74.5 (L) 79.0 - 97.0 fL    MCH 24.9 (L) 26.6 - 33.0 pg    MCHC 33.4 31.5 - 35.7 g/dL    RDW 13.3 12.3 - 15.4 %    RDW-SD 34.8 (L) 37.0 - 54.0 fl    MPV 9.1 6.0 - 12.0 fL    Platelets 375 140 - 450 10*3/mm3   Manual Differential    Collection Time: 10/06/20  1:54 PM    Specimen: Blood   Result Value Ref Range    Neutrophil % 82.1 (H) 42.7 - 76.0 %    Lymphocyte % 11.6 (L) 19.6 - 45.3 %    Monocyte % 5.3 5.0 - 12.0 %    Eosinophil % 1.1 0.3 - 6.2 %    Neutrophils Absolute 11.03 (H) 1.70 - 7.00 10*3/mm3    Lymphocytes Absolute 1.56 0.70 - 3.10 10*3/mm3    Monocytes Absolute 0.71 0.10 - 0.90 10*3/mm3    Eosinophils Absolute 0.15 0.00 - 0.40 10*3/mm3    RBC Morphology Normal Normal    WBC Morphology Normal Normal    Platelet Morphology Normal Normal   Urinalysis With Microscopic If Indicated (No Culture) - Urine, Clean Catch    Collection Time: 10/06/20  1:55 PM    Specimen: Urine, Clean Catch   Result Value Ref  Range    Color, UA Yellow Yellow, Straw    Appearance, UA Clear Clear    pH, UA 5.5 5.0 - 8.0    Specific Gravity, UA >=1.030 1.005 - 1.030    Glucose, UA Negative Negative    Ketones, UA 15 mg/dL (1+) (A) Negative    Bilirubin, UA Negative Negative    Blood, UA Negative Negative    Protein, UA 30 mg/dL (1+) (A) Negative    Leuk Esterase, UA Negative Negative    Nitrite, UA Negative Negative    Urobilinogen, UA 0.2 E.U./dL 0.2 - 1.0 E.U./dL   Urinalysis, Microscopic Only - Urine, Clean Catch    Collection Time: 10/06/20  1:55 PM    Specimen: Urine, Clean Catch   Result Value Ref Range    RBC, UA None Seen None Seen, 0-2 /HPF    WBC, UA 3-5 (A) None Seen, 0-2 /HPF    Bacteria, UA 2+ (A) None Seen /HPF    Squamous Epithelial Cells, UA 21-30 (A) None Seen, 0-2 /HPF    Hyaline Casts, UA 3-6 None Seen /LPF    Methodology Manual Light Microscopy    Respiratory Panel PCR w/COVID-19(SARS-CoV-2) ANN[facility]E/[facility]O/[facility] In-House, NP Swab in UTM/VTM, 3-4 HR TAT - Swab, Nasopharynx    Collection Time: 10/06/20  5:51 PM    Specimen: Nasopharynx; Swab   Result Value Ref Range    ADENOVIRUS, PCR Not Detected Not Detected    Coronavirus 229E Not Detected Not Detected    Coronavirus HKU1 Not Detected Not Detected    Coronavirus NL63 Not Detected Not Detected    Coronavirus OC43 Not Detected Not Detected    COVID19 Not Detected Not Detected - Ref. Range    Human Metapneumovirus Not Detected Not Detected    Human Rhinovirus/Enterovirus Detected (A) Not Detected    Influenza A PCR Not Detected Not Detected    Influenza A H1 Not Detected Not Detected    Influenza A H1 2009 PCR Not Detected Not Detected    Influenza A H3 Not Detected Not Detected    Influenza B PCR Not Detected Not Detected    Parainfluenza Virus 1 Not Detected Not Detected    Parainfluenza Virus 2 Not Detected Not Detected    Parainfluenza Virus 3 Not Detected Not Detected    Parainfluenza Virus 4 Not Detected Not Detected    RSV, PCR Not Detected Not Detected     Bordetella pertussis pcr Not Detected Not Detected    Bordetella parapertussis PCR Not Detected Not Detected    Chlamydophila pneumoniae PCR Not Detected Not Detected    Mycoplasma pneumo by PCR Not Detected Not Detected       Ordered the above labs and independently reviewed the results.        RADIOLOGY  Ct Abdomen Pelvis With Contrast    Result Date: 10/6/2020  CT ABDOMEN PELVIS W CONTRAST-  INDICATIONS: Abdominal pain  TECHNIQUE: Radiation dose reduction techniques were utilized, including automated exposure control and exposure modulation based on body size. Enhanced ABDOMEN AND PELVIS CT  COMPARISON: None available  FINDINGS:  A partly calcified gallstone, about 3 cm, is seen in the gallbladder.  Otherwise unremarkable appearance of the liver, spleen, adrenal glands, pancreas, kidneys, bladder. Intrauterine device is seen in the uterus.  No bowel obstruction or abnormal bowel thickening is identified. Mild to moderate proximal colonic fecal retention.  No free intraperitoneal gas. Small pelvic free fluid.  Scattered small mesenteric and para-aortic lymph nodes are seen that are not significant by size criteria.  Abdominal aorta is not aneurysmal.  The lung bases are clear.  Endplate changes are seen in the spine. No acute fracture is identified.          1. No acute inflammatory process of bowel is identified. Small pelvic free fluid, nonspecific. Follow-up as indications persist. 2. No obstructive uropathy. 3. Cholelithiasis.   This report was finalized on 10/6/2020 5:27 PM by Dr. Vincent Edmondson M.D.      Us Gallbladder    Result Date: 10/6/2020  US GALLBLADDER-  INDICATIONS: Right upper quadrant pain  TECHNIQUE: Ultrasound of the right upper quadrant  COMPARISON: CT from 10/06/2020  FINDINGS:  The gallbladder is tender and contains a 2.7 cm stone at the neck of the gallbladder, and an additional 1.3 cm mobile gallstone in the gallbladder. Minimal pericholecystic fluid. Some areas of the gallbladder wall  appear mildly thickened.  No intrahepatic biliary ductal dilatation is demonstrated. The common biliary duct caliber is mildly prominent, 7 mm, without a specific cause such as stone or lesion identified, although the distal common biliary duct is obscured, MRCP correlation could be obtained if indicated.  No liver lesion is identified. Diffusely, the echogenicity of the liver is otherwise in the range of normal relative to that of the right kidney.  The pancreas is partly obscured. The right kidney, 12.1 cm, and the pancreas otherwise appear unremarkable.          Cholelithiasis with evidence of acute cholecystitis. Mildly prominent common biliary duct caliber.  Discussed by telephone with Dr. Morrow at 1818, 10/06/2020.  This report was finalized on 10/6/2020 6:17 PM by Dr. Vincent Edmondson M.D.        I ordered the above noted radiological studies. Reviewed by me and discussed with radiologist.  See dictation for official radiology interpretation.      PROCEDURES    Procedures      MEDICATIONS GIVEN IN ER    Medications   sodium chloride 0.9 % flush 10 mL (10 mL Intravenous Not Given 10/6/20 2031)   sodium chloride 0.9 % flush 10 mL (has no administration in time range)   dextrose 5 % and sodium chloride 0.45 % with KCl 20 mEq/L infusion (100 mL/hr Intravenous New Bag 10/6/20 1936)   HYDROmorphone (DILAUDID) injection 0.5 mg (0.5 mg Intravenous Given 10/6/20 2032)     And   naloxone (NARCAN) injection 0.4 mg (has no administration in time range)   ondansetron (ZOFRAN) injection 4 mg (has no administration in time range)   piperacillin-tazobactam (ZOSYN) 3.375 g in iso-osmotic dextrose 50 ml (premix) (3.375 g Intravenous Not Given 10/6/20 1858)   ketorolac (TORADOL) injection 15 mg (15 mg Intravenous Given 10/6/20 1609)   iopamidol (ISOVUE-300) 61 % injection 100 mL (85 mL Intravenous Given by Other 10/6/20 1655)   butorphanol (STADOL) injection 1 mg (1 mg Intravenous Given 10/6/20 1759)   ondansetron (ZOFRAN)  injection 4 mg (4 mg Intravenous Given 10/6/20 1758)   metoprolol tartrate (LOPRESSOR) injection 5 mg (5 mg Intravenous Given 10/6/20 1803)   piperacillin-tazobactam (ZOSYN) 3.375 g in iso-osmotic dextrose 50 ml (premix) (0 g Intravenous Stopped 10/6/20 1835)   iopamidol (ISOVUE-300) 61 % injection  - ADS Override Pull (has no administration in time range)         PROGRESS, DATA ANALYSIS, CONSULTS, AND MEDICAL DECISION MAKING    All labs have been independently reviewed by me.  All radiology studies have been reviewed by me and discussed with radiologist dictating the report.   EKG's independently viewed and interpreted by me.  Discussion below represents my analysis of pertinent findings related to patient's condition, differential diagnosis, treatment plan and final disposition.        ED Course as of Oct 06 2148   Tue Oct 06, 2020   2147 CBC shows a mild leukocytosis, chemistry is unremarkable including liver functions.  Lipase is negative    [DP]   2147 Urinalysis is a clean-catch and appears to be contaminated.  She has no urinary complaints and I suspect this is not an acute infection.    [DP]   2147 CT scan of the abdomen pelvis shows a large gallstone with some inflammatory changes about the gallbladder but the common duct is not well-visualized.    [DP]   2148 Gallbladder ultrasound shows similar findings of a large immovable stone in the neck of the gallbladder with some inflammatory changes surrounding.  Common duct is borderline in size.    [DP]   2148 Patient was given IV Zosyn, and I spoke with Dr. Dean from general surgery who came to evaluate the patient in the ED and agrees to admit her to a [facility] bed for cholecystectomy whenever available.    [DP]   2148 Patient was given IV Toradol and Stadol in the ED for her pain which seemed to help    [DP]      ED Course User Index  [DP] Jose Morrow MD           PPE: Both the patient and I wore a surgical mask throughout the entire patient encounter.  I wore protective goggles.     AS OF 21:48 EDT VITALS:    BP - 143/98  HR - 90  TEMP - 97.4 °F (36.3 °C) (Oral)  O2 SATS - 100%        DIAGNOSIS  Final diagnoses:   Acute cholecystitis         DISPOSITION  Admit to [facility]           Jose Morrow MD  10/06/20 2148      Electronically signed by Jose Morrow MD at 10/06/20 2148     Anne Marie Flores, RN at 10/06/20 1806        Dianne UMANA at bedside.      Anne Marie Flores, LUDMILA  10/06/20 1807      Electronically signed by Anne Marie Flores, RN at 10/06/20 1807         Physician Progress Notes (last 48 hours) (Notes from 10/05/20 1316 through 10/07/20 1316)    No notes of this type exist for this encounter.       Consult Notes (last 48 hours) (Notes from 10/05/20 1316 through 10/07/20 1316)    No notes of this type exist for this encounter.       All medication doses during the admission are shown, including meds that are no longer on order.   Scheduled Meds Sorted by Name  for Magy Quach as of 10/5/20 through 10/7/20    1 Day 3 Days 7 Days 10 Days <  Today >     Legend:                          Inactive     Active     Other Encounter     Linked                 Medications 10/05/20 10/06/20 10/07/20   butorphanol (STADOL) injection 1 mg   Dose: 1 mg  Freq: Once Route: IV  Start: 10/06/20 1744 End: 10/06/20 1759    Admin Instructions:       If given for pain, use the following pain scale:  Mild Pain = Pain Score of 1-3, CPOT 1-2  Moderate Pain = Pain Score of 4-6, CPOT 3-4  Severe Pain = Pain Score of 7-10, CPOT 5-8     1759             famotidine (PEPCID) injection 20 mg   Dose: 20 mg  Freq: Once Route: IV  Start: 10/07/20 1301 End: 10/07/20 1301    Admin Instructions:   Dilute to 10 mL total volume and give IV push over 2 minutes.      1301            iopamidol (ISOVUE-300) 61 % injection 100 mL   Dose: 100 mL  Freq: Once in Imaging Route: IV  Start: 10/06/20 1656 End: 10/06/20 1655     1655             ketorolac (TORADOL) injection 15  mg   Dose: 15 mg  Freq: Once Route: IV  Start: 10/06/20 1540 End: 10/06/20 1609    Admin Instructions:       If given for pain, use the following pain scale:  Mild Pain = Pain Score of 1-3, CPOT 1-2  Moderate Pain = Pain Score of 4-6, CPOT 3-4  Severe Pain = Pain Score of 7-10, CPOT 5-8     1609             metoprolol tartrate (LOPRESSOR) injection 5 mg   Dose: 5 mg  Freq: Once Route: IV  Start: 10/06/20 1745 End: 10/06/20 1803     1803             ondansetron (ZOFRAN) injection 4 mg   Dose: 4 mg  Freq: Once Route: IV  Start: 10/06/20 1744 End: 10/06/20 1758     1758             piperacillin-tazobactam (ZOSYN) 3.375 g in iso-osmotic dextrose 50 ml (premix)   Dose: 3.375 g  Freq: Every 8 Hours Route: IV  Indications of Use: INTRA-ABDOMINAL INFECTION  Last Dose: Stopped (10/07/20 1226)  Start: 10/06/20 1945 End: 10/13/20 1559    Admin Instructions:   Refrigerate     (2376) [C]   1050 5145 8022   1600        piperacillin-tazobactam (ZOSYN) 3.375 g in iso-osmotic dextrose 50 ml (premix)   Dose: 3.375 g  Freq: Once Route: IV  Indications of Use: INTRA-ABDOMINAL INFECTION  Last Dose: Stopped (10/06/20 1835)  Start: 10/06/20 1747 End: 10/06/20 1835    Admin Instructions:   Refrigerate     1803   1835           sodium chloride 0.9 % flush 10 mL   Dose: 10 mL  Freq: Every 12 Hours Scheduled Route: IV  Start: 10/06/20 2100     2031) [C]          (5651) 1837   2100        sodium chloride 0.9 % flush 3 mL   Dose: 3 mL  Freq: Every 12 Hours Scheduled Route: IV  Start: 10/07/20 1301      1301   2100          Medications 10/05/20 10/06/20 10/07/20       Continuous Meds Sorted by Name  for Magy Quach as of 10/5/20 through 10/7/20   Legend:                          Inactive     Active     Other Encounter     Linked                 Medications 10/05/20 10/06/20 10/07/20   dextrose 5 % and sodium chloride 0.45 % with KCl 20 mEq/L infusion   Rate: 100 mL/hr Dose: 100 mL/hr  Freq: Continuous Route: IV  Last Dose: Stopped  (10/07/20 1226)  Start: 10/06/20 1945     1914   1936        0531   1226          lactated ringers infusion   Rate: 9 mL/hr Dose: 9 mL/hr  Freq: Continuous Route: IV  Last Dose: 9 mL/hr (10/07/20 1301)  Start: 10/07/20 1301      1301                PRN Meds Sorted by Name  for Magy Quach as of 10/5/20 through 10/7/20   Legend:                          Inactive     Active     Other Encounter     Linked                 Medications 10/05/20 10/06/20 10/07/20   butorphanol (STADOL) injection 1 mg   Dose: 1 mg  Freq: Every 2 Hours PRN Route: IV  PRN Reason: Moderate Pain   Start: 10/06/20 2302    Admin Instructions:       If given for pain, use the following pain scale:  Mild Pain = Pain Score of 1-3, CPOT 1-2  Moderate Pain = Pain Score of 4-6, CPOT 3-4  Severe Pain = Pain Score of 7-10, CPOT 5-8     2359          0357   0716   1022     1233            fentaNYL citrate (PF) (SUBLIMAZE) injection 50 mcg   Dose: 50 mcg  Freq: Every 10 Minutes PRN Route: IV  PRN Reason: Severe Pain   Start: 10/07/20 1259    Admin Instructions:   Maximum total dose of fentanyl is 100 mcg.  If given for pain, use the following pain scale:  Mild Pain = Pain Score of 1-3, CPOT 1-2  Moderate Pain = Pain Score of 4-6, CPOT 3-4  Severe Pain = Pain Score of 7-10, CPOT 5-8      1300            HYDROmorphone (DILAUDID) injection 0.5 mg   Dose: 0.5 mg  Freq: Every 2 Hours PRN Route: IV  PRN Reason: Severe Pain   Start: 10/06/20 1851 End: 10/13/20 1850    Admin Instructions:   If given for pain, use the following pain scale:  Mild Pain = Pain Score of 1-3, CPOT 1-2  Moderate Pain = Pain Score of 4-6, CPOT 3-4  Severe Pain = Pain Score of 7-10, CPOT 5-8     2032   2240        0216   0531   0901     1134   1233          And  naloxone (NARCAN) injection 0.4 mg   Dose: 0.4 mg  Freq: Every 5 Minutes PRN Route: IV  PRN Reason: Respiratory Depression  Start: 10/06/20 5270    Admin Instructions:   If Respiratory Rate Less Than 8 or Patient is Difficult to  Arouse, Stop ALL Narcotics & Contact Provider.  Administer Slow IV Push.  Repeat As Ordered Until Respiratory Rate is Greater Than 12.      1233            lidocaine PF 1% (XYLOCAINE) injection 0.5 mL   Dose: 0.5 mL  Freq: Once As Needed Route: IJ  PRN Comment: IV Start  Start: 10/07/20 1259         midazolam (VERSED) injection 1 mg   Dose: 1 mg  Freq: Every 10 Minutes PRN Route: IV  PRN Reason: Anxiety  Indications of Use: ANXIETY  Start: 10/07/20 1259    Admin Instructions:   May repeat dose in 10 minutes x 1 then contact provider for additional orders.        1300            ondansetron (ZOFRAN) injection 4 mg   Dose: 4 mg  Freq: Every 6 Hours PRN Route: IV  PRN Reasons: Nausea,Vomiting  Start: 10/06/20 1852      0803            sodium chloride 0.9 % flush 10 mL   Dose: 10 mL  Freq: As Needed Route: IV  PRN Reason: Line Care  Start: 10/06/20 1851      1233            sodium chloride 0.9 % flush 10 mL   Dose: 10 mL  Freq: As Needed Route: IV  PRN Reason: Line Care  Start: 10/06/20 1328 End: 10/06/20 1852     1852-D/C'd           sodium chloride 0.9 % flush 3-10 mL   Dose: 3-10 mL  Freq: As Needed Route: IV  PRN Reason: Line Care  Start: 10/07/20 1259         Medications 10/05/20 10/06/20 10/07/20         CURRENTLY IN OR

## 2020-10-07 NOTE — ANESTHESIA POSTPROCEDURE EVALUATION
Patient: Magy Quach    Procedure Summary     Date: 10/07/20 Room / Location: [facility] OR 06 / [facility] MAIN OR    Anesthesia Start: 1322 Anesthesia Stop: 1443    Procedure: CHOLECYSTECTOMY LAPAROSCOPIC INTRAOPERATIVE CHOLANGIOGRAM (N/A Abdomen) Diagnosis:     Surgeon: Donnie Dean MD Provider: Iam Connors MD    Anesthesia Type: general ASA Status: 2 - Emergent          Anesthesia Type: general    Vitals  Vitals Value Taken Time   /98 10/07/20 1515   Temp 36.9 °C (98.4 °F) 10/07/20 1515   Pulse 113 10/07/20 1529   Resp 16 10/07/20 1515   SpO2 97 % 10/07/20 1529   Vitals shown include unvalidated device data.        Post Anesthesia Care and Evaluation      Comments: Pt. Discharged prior to being evaluated by anesthesia.  Chart is reviewed and no complications are noted.  THIS CASE IS NOT MEDICALLY DIRECTED

## 2020-10-07 NOTE — ANESTHESIA PREPROCEDURE EVALUATION
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

## 2020-10-08 VITALS
BODY MASS INDEX: 29.36 KG/M2 | HEIGHT: 63 IN | WEIGHT: 165.7 LBS | DIASTOLIC BLOOD PRESSURE: 92 MMHG | HEART RATE: 105 BPM | SYSTOLIC BLOOD PRESSURE: 140 MMHG | TEMPERATURE: 98.5 F | OXYGEN SATURATION: 98 % | RESPIRATION RATE: 16 BRPM

## 2020-10-08 LAB
CYTO UR: NORMAL
LAB AP CASE REPORT: NORMAL
PATH REPORT.FINAL DX SPEC: NORMAL
PATH REPORT.GROSS SPEC: NORMAL

## 2020-10-08 PROCEDURE — 99024 POSTOP FOLLOW-UP VISIT: CPT | Performed by: SURGERY

## 2020-10-08 PROCEDURE — 25010000002 PIPERACILLIN SOD-TAZOBACTAM PER 1 G: Performed by: SURGERY

## 2020-10-08 RX ADMIN — TAZOBACTAM SODIUM AND PIPERACILLIN SODIUM 3.38 G: 375; 3 INJECTION, SOLUTION INTRAVENOUS at 01:25

## 2020-10-08 RX ADMIN — HYDROCODONE BITARTRATE AND ACETAMINOPHEN 2 TABLET: 5; 325 TABLET ORAL at 09:03

## 2020-10-08 RX ADMIN — HYDROCODONE BITARTRATE AND ACETAMINOPHEN 2 TABLET: 5; 325 TABLET ORAL at 05:26

## 2020-10-08 NOTE — PAYOR COMM NOTE
"Magy Quach (35 y.o. Female)     PLEASE SEE ATTACHED DC SUMMARY    REF#[id number]    THANK YOU    [place] [facility]    Date of Birth Social Security Number Address Home Phone MRN    1985  [id number] [facility] [phone] [id number]    Scientologist Marital Status          Congregation        Admission Date Admission Type Admitting Provider Attending Provider Department, Room/Bed    10/6/20 Emergency Donnie Dean MD  [facility] [address] [place], P676/1    Discharge Date Discharge Disposition Discharge Destination        10/8/2020 Home or Self Care              Attending Provider: (none)   Allergies: No Known Allergies    Isolation: Droplet   Infection: Rhinovirus  (10/06/20)   Code Status: CPR    Ht: 160 cm (63\")   Wt: 75.2 kg (165 lb 11.2 oz)    Admission Cmt: None   Principal Problem: None                Active Insurance as of 10/6/2020     Primary Coverage     Payor Plan Insurance Group Employer/Plan Group    ANTHEM BLUE CROSS ANTHEM [employer] CROSS BLUE SHIELD PPO [id number]     Payor Plan Address Payor Plan Phone Number Payor Plan Fax Number Effective Dates    [address] [phone]  9/18/2016 - None Entered    [facility] [id number]       Subscriber Name Subscriber Birth Date Member ID       PAUL QUACH [birth year]/19/1984 [id number]                 Emergency Contacts      (Rel.) Home Phone Work Phone Mobile Phone    Paul Quach (Spouse) [phone] -- --               Discharge Summary      Donnie Dean MD at 10/08/20 0831        DATE OF ADMIT: 10/6/2020    DATE OF DISCHARGE: 10/8/2020    DIAGNOSIS: Cholelithiasis with acute cholecystitis and hydrops    FINAL PATHOLOGY: Pending at time of discharge    PROCEDURES: Laparoscopic cholecystectomy cholangiogram 10/6/2020    SUMMARY OF HOSPITAL COURSE: Admitted through emergency room with severe abdominal pain and evidence of cholelithiasis and acute cholecystitis.  Given intravenous antibiotics and underwent " laparoscopic cholecystectomy.  Uneventful postop course. Tolerating diet, incisions in good order and afebrile with stable vital signs at discharge. Prescribed hydrocodone for pain.    DIET: Regular    ACTIVITY: Walking encouraged, no lifting or strenuous activity    MEDICATIONS: Refer to MAR    FOLLOW-UP: To call office and schedule 1 week follow-up appointment    Donnie Dean M.D.    Electronically signed by Donnie Dean MD at 10/08/20 0886

## 2020-10-08 NOTE — PROGRESS NOTES
Case Management Discharge Note      Final Note: Discharged home. Deena Smith RN    Provided Post Acute Provider List?: N/A  N/A Provider List Comment: Denies needs. Plan is home    Selected Continued Care - Discharged on 10/8/2020 Admission date: 10/6/2020 - Discharge disposition: Home or Self Care   Transportation Services  Private: Car    Final Discharge Disposition Code: 01 - home or self-care

## 2020-10-08 NOTE — DISCHARGE SUMMARY
DATE OF ADMIT: 10/6/2020    DATE OF DISCHARGE: 10/8/2020    DIAGNOSIS: Cholelithiasis with acute cholecystitis and hydrops    FINAL PATHOLOGY: Pending at time of discharge    PROCEDURES: Laparoscopic cholecystectomy cholangiogram 10/6/2020    SUMMARY OF HOSPITAL COURSE: Admitted through emergency room with severe abdominal pain and evidence of cholelithiasis and acute cholecystitis.  Given intravenous antibiotics and underwent laparoscopic cholecystectomy.  Uneventful postop course. Tolerating diet, incisions in good order and afebrile with stable vital signs at discharge. Prescribed hydrocodone for pain.    DIET: Regular    ACTIVITY: Walking encouraged, no lifting or strenuous activity    MEDICATIONS: Refer to MAR    FOLLOW-UP: To call office and schedule 1 week follow-up appointment    Donnie Dean M.D.

## 2020-10-08 NOTE — PLAN OF CARE
Goal Outcome Evaluation:  Plan of Care Reviewed With: patient  Progress: improving  Outcome Summary: ivf, iv abt, vdg, lap x4 with dermabond, walked, med for pain

## 2020-10-08 NOTE — PLAN OF CARE
Goal Outcome Evaluation:  Plan of Care Reviewed With: patient  Progress: improving  Outcome Summary: vss. d/c'd home. lap x 4 cdi

## 2020-10-15 ENCOUNTER — OFFICE VISIT (OUTPATIENT)
Dept: SURGERY | Facility: CLINIC | Age: 35
End: 2020-10-15

## 2020-10-15 VITALS — HEIGHT: 63 IN | BODY MASS INDEX: 29.35 KG/M2

## 2020-10-15 DIAGNOSIS — Z09 FOLLOW UP: Primary | ICD-10-CM

## 2020-10-15 PROCEDURE — 99024 POSTOP FOLLOW-UP VISIT: CPT | Performed by: SURGERY

## 2020-10-17 NOTE — PROGRESS NOTES
Postoperative visit    Laparoscopic cholecystectomy with cholangiogram 10/7/2020    Pathology: Acute necrotizing calculus cholecystitis  Cholangiogram: Normal    Office visit: Incisions are healing well and she is doing well, reporting no problems from the surgery.  Activity restrictions discussed.  Follow-up as needed.

## 2021-01-02 ENCOUNTER — IMMUNIZATION (OUTPATIENT)
Dept: VACCINE CLINIC | Facility: HOSPITAL | Age: 36
End: 2021-01-02

## 2021-01-02 PROCEDURE — 0001A: CPT | Performed by: INTERNAL MEDICINE

## 2021-01-02 PROCEDURE — 91300 HC SARSCOV02 VAC 30MCG/0.3ML IM: CPT | Performed by: INTERNAL MEDICINE

## 2021-01-23 ENCOUNTER — IMMUNIZATION (OUTPATIENT)
Dept: VACCINE CLINIC | Facility: HOSPITAL | Age: 36
End: 2021-01-23

## 2021-01-23 PROCEDURE — 91300 HC SARSCOV02 VAC 30MCG/0.3ML IM: CPT | Performed by: INTERNAL MEDICINE

## 2021-01-23 PROCEDURE — 0002A: CPT | Performed by: INTERNAL MEDICINE

## 2023-12-18 ENCOUNTER — TRANSCRIBE ORDERS (OUTPATIENT)
Dept: LAB | Facility: HOSPITAL | Age: 38
End: 2023-12-18
Payer: COMMERCIAL

## 2023-12-18 ENCOUNTER — LAB (OUTPATIENT)
Dept: LAB | Facility: HOSPITAL | Age: 38
End: 2023-12-18
Payer: COMMERCIAL

## 2023-12-18 DIAGNOSIS — E65 ADIPOSITY, LOCALIZED: ICD-10-CM

## 2023-12-18 DIAGNOSIS — E65 ADIPOSITY, LOCALIZED: Primary | ICD-10-CM

## 2023-12-18 DIAGNOSIS — E66.3 OVER WEIGHT: ICD-10-CM

## 2023-12-18 LAB
ALBUMIN SERPL-MCNC: 4.4 G/DL (ref 3.5–5.2)
ALBUMIN/GLOB SERPL: 1.3 G/DL
ALP SERPL-CCNC: 97 U/L (ref 39–117)
ALT SERPL W P-5'-P-CCNC: 32 U/L (ref 1–33)
ANION GAP SERPL CALCULATED.3IONS-SCNC: 9.9 MMOL/L (ref 5–15)
AST SERPL-CCNC: 26 U/L (ref 1–32)
BILIRUB SERPL-MCNC: <0.2 MG/DL (ref 0–1.2)
BUN SERPL-MCNC: 14 MG/DL (ref 6–20)
BUN/CREAT SERPL: 16.9 (ref 7–25)
CALCIUM SPEC-SCNC: 9.6 MG/DL (ref 8.6–10.5)
CHLORIDE SERPL-SCNC: 103 MMOL/L (ref 98–107)
CHOLEST SERPL-MCNC: 134 MG/DL (ref 0–200)
CO2 SERPL-SCNC: 25.1 MMOL/L (ref 22–29)
CREAT SERPL-MCNC: 0.83 MG/DL (ref 0.57–1)
DEPRECATED RDW RBC AUTO: 37.2 FL (ref 37–54)
EGFRCR SERPLBLD CKD-EPI 2021: 93.3 ML/MIN/1.73
ERYTHROCYTE [DISTWIDTH] IN BLOOD BY AUTOMATED COUNT: 14.2 % (ref 12.3–15.4)
GLOBULIN UR ELPH-MCNC: 3.3 GM/DL
GLUCOSE SERPL-MCNC: 95 MG/DL (ref 65–99)
HCT VFR BLD AUTO: 40.1 % (ref 34–46.6)
HDLC SERPL-MCNC: 48 MG/DL (ref 40–60)
HGB BLD-MCNC: 12.2 G/DL (ref 12–15.9)
LDLC SERPL CALC-MCNC: 69 MG/DL (ref 0–100)
LDLC/HDLC SERPL: 1.41 {RATIO}
MCH RBC QN AUTO: 22.7 PG (ref 26.6–33)
MCHC RBC AUTO-ENTMCNC: 30.4 G/DL (ref 31.5–35.7)
MCV RBC AUTO: 74.7 FL (ref 79–97)
NRBC BLD AUTO-RTO: 0 /100 WBC (ref 0–0.2)
PLATELET # BLD AUTO: 345 10*3/MM3 (ref 140–450)
PMV BLD AUTO: 9.2 FL (ref 6–12)
POTASSIUM SERPL-SCNC: 4.2 MMOL/L (ref 3.5–5.2)
PROT SERPL-MCNC: 7.7 G/DL (ref 6–8.5)
RBC # BLD AUTO: 5.37 10*6/MM3 (ref 3.77–5.28)
SODIUM SERPL-SCNC: 138 MMOL/L (ref 136–145)
TRIGL SERPL-MCNC: 92 MG/DL (ref 0–150)
TSH SERPL DL<=0.05 MIU/L-ACNC: 2.26 UIU/ML (ref 0.27–4.2)
VLDLC SERPL-MCNC: 17 MG/DL (ref 5–40)
WBC NRBC COR # BLD AUTO: 6.95 10*3/MM3 (ref 3.4–10.8)

## 2023-12-18 PROCEDURE — 80050 GENERAL HEALTH PANEL: CPT

## 2023-12-18 PROCEDURE — 83525 ASSAY OF INSULIN: CPT

## 2023-12-18 PROCEDURE — 80061 LIPID PANEL: CPT

## 2023-12-18 PROCEDURE — 36415 COLL VENOUS BLD VENIPUNCTURE: CPT

## 2023-12-22 LAB — INSULIN SERPL-ACNC: 10 UIU/ML

## 2025-06-03 NOTE — H&P
[facility] - Emergency Dept.  [facility]  History & Physical    Patient Name: Rhona Zaman  MRN: [id number]  Patient Class: OP- Observation  Admission Date: 6/3/2025  Attending Physician: Chrissie Lopez MD   Primary Care Provider: Jany Primary Doctor         Patient information was obtained from patient and ER records.     Subjective:     Principal Problem:Pain of upper abdomen    Chief Complaint:   Chief Complaint   Patient presents with    Abdominal Pain    Generalized Body Aches     Pt c/o generalized abdominal pain, body aches, right side chest tightness for the past week with increase pain today. +N/V         HPI:  Patient is a 42-year-old female with past medical history significant for hypertension, pulmonary embolism in 2020, and tobacco abuse who presented  to ED with complaint of generalized abdominal pain. She also reports nausea, vomiting, constipation, and chest tightness. She denies vomiting, dysuria, fever, chills, back pain, shortness of breath, productive cough, weakness, edema. on arrival to ED, temp 98.5°, heart rate 88, respirations 17, blood pressure 140/82, 98% SpO2 on room air. lab workup shows WBC 13.65, hemoglobin 12.6, hematocrit 38.1, platelets 363, sodium 135, potassium 3.4, chloride 104, CO2 17, BUN 8, creatinine 0.7, glucose 158, alk-phos 97, AST 26, ALT 23, lipase 18, negative covid 19 screening, negative influenza A/B screening, rapid HIV negative, UA  done with no signs of infection.  CT abdomen and pelvis with IV contrast done and STAT RAD  report shows mild thickening of walls of left colon and sigmoid small, incomplete distention versus mild colitis, cholelithiasis and a distended gallbladder with stone in gallbladder neck, as well as incidental finding of 14 mm right renal artery aneurysm, awaiting official read. While in ED, she was given IV Zosyn, dilaudid, morphine, zofran, and phenergan. General surgery was consulted per ED. Hospital Medicine was consulted for  SUMMARY (A/P):    35-year-old lady with typical presentation for biliary colic and gallstones on CT and ultrasound.  Due to ongoing pain warrants admission to hospital.  Will start her on intravenous Dilaudid as needed for pain.  Intravenous Zosyn for possible cholecystitis evidenced by persistent tenderness on exam and elevated white blood cell count.  Intravenous Zofran for nausea.  Plan laparoscopic cholecystectomy with cholangiogram tomorrow.  She understands the options and wishes to proceed as outlined.  She understands nature of the procedure and the risks including but not limited to bleeding, infection, conversion to open procedure, postoperative bile leak, and the bowel function changes which can accompany cholecystectomy.      CC:    Abdominal pain    HPI:    35-year-old lady with 4-day history of progressive moderately severe epigastric and right upper quadrant abdominal pain associated with nausea.  No fever or chills.    RADIOLOGY/ENDOSCOPY:    • CT abdomen pelvis 10/6/2020: Demonstrates cholelithiasis with one 3 cm calcified gallstone.  I reviewed the images and concur.    LABS:    • CMP normal  • Lipase 12  • WBC 13.4    PHYSICAL EXAM:   • Constitutional: Well-developed well-nourished, no acute distress  • Vital signs: /115, HR 83, RR 18, T 98.5, weight 158 pounds, height 63 inches, BMI 28  • Eyes: Conjunctiva normal, sclera nonicteric  • ENMT: Hearing grossly normal, oral mucosa moist  • Neck: Supple, no palpable mass, trachea midline  • Respiratory: Normal inspiratory effort  • Cardiovascular: Regular rate, no jugular venous distention, no peripheral edema  • Gastrointestinal: Soft, nontender, no palpable mass, no hepatosplenomegaly  • Lymphatics (palpable nodes):  cervical-negative, inguinal-negative  • Skin:  Warm, dry, no rash on visualized skin surfaces  • Musculoskeletal: Symmetric strength, no asymmetric muscular atrophy  • Psychiatric: Alert and oriented ×3, normal affect      ALLERGIES:   • None    MEDICATIONS:   • None    PMH:    • Unremarkable    PSH:    • None    FAMILY HISTORY:    • Negative for gallbladder disease    SOCIAL HISTORY:   • Denies tobacco use  • Rare alcohol use    ROS:    Influenza Like Illness: no fever, no  cough, no  sore throat, no  body aches, no loss of sense of taste or smell, no known exposure to person with Covid-19.  All other systems reviewed and negative other than presenting complaints.    ALYX BANKS M.D.     admission due to abdominal pain. General surgery recommended US gallbladder, which is ordered, pending.    Past Medical History:   Diagnosis Date    Abnormal Pap smear of cervix     Essential (primary) hypertension     Pulmonary embolism        Past Surgical History:   Procedure Laterality Date     SECTION      DIAGNOSTIC LAPAROSCOPY  2023    DILATION AND CURETTAGE OF UTERUS         Review of patient's allergies indicates:  No Known Allergies    No current facility-administered medications on file prior to encounter.     Current Outpatient Medications on File Prior to Encounter   Medication Sig    hydroCHLOROthiazide (MICROZIDE) 12.5 mg capsule hydrochlorothiazide Take No date recorded No form recorded No frequency recorded No route recorded No set duration recorded No set duration amount recorded active No dosage strength recorded No dosage strength units of measure recorded    ibuprofen (ADVIL,MOTRIN) 600 MG tablet Take 1 tablet (600 mg total) by mouth 3 (three) times daily.    oxyCODONE-acetaminophen (PERCOCET) 5-325 mg per tablet Take 1 tablet by mouth every 4 (four) hours as needed for Pain. for pain.     Family History    None       Tobacco Use    Smoking status: Some Days     Types: Cigarettes    Smokeless tobacco: Never   Substance and Sexual Activity    Alcohol use: Not on file    Drug use: Not on file    Sexual activity: Not on file     Review of Systems   Constitutional: Negative.  Negative for chills and fever.   HENT: Negative.     Eyes: Negative.    Respiratory:  Positive for chest tightness. Negative for cough, shortness of breath and wheezing.    Cardiovascular: Negative.  Negative for chest pain, palpitations and leg swelling.   Gastrointestinal:  Positive for abdominal pain, constipation, nausea and vomiting.   Endocrine: Negative.    Genitourinary: Negative.    Musculoskeletal: Negative.    Skin: Negative.    Allergic/Immunologic: Negative.    Neurological: Negative.     Psychiatric/Behavioral: Negative.     All other systems reviewed and are negative.    Objective:     Vital Signs (Most Recent):  Temp: 98.5 °F (36.9 °C) (06/02/25 2127)  Pulse: 66 (06/03/25 0557)  Resp: 18 (06/03/25 0557)  BP: (!) 195/95 (06/03/25 0557)  SpO2: 100 % (06/03/25 0557) Vital Signs (24h Range):  Temp:  [98.5 °F (36.9 °C)] 98.5 °F (36.9 °C)  Pulse:  [66-88] 66  Resp:  [16-19] 18  SpO2:  [98 %-100 %] 100 %  BP: (136-195)/(73-95) 195/95     Weight: 104.3 kg (230 lb)  Body mass index is 38.27 kg/m².     Physical Exam  Vitals reviewed.   Constitutional:       General: She is not in acute distress.     Appearance: She is ill-appearing.   HENT:      Head: Normocephalic and atraumatic.      Nose: Nose normal.      Mouth/Throat:      Mouth: Mucous membranes are moist.      Pharynx: Oropharynx is clear.   Eyes:      Extraocular Movements: Extraocular movements intact.      Pupils: Pupils are equal, round, and reactive to light.   Cardiovascular:      Rate and Rhythm: Normal rate and regular rhythm.      Pulses: Normal pulses.      Heart sounds: Normal heart sounds.   Pulmonary:      Effort: Pulmonary effort is normal. No respiratory distress.      Breath sounds: Normal breath sounds. No wheezing or rales.   Chest:      Chest wall: No tenderness.   Abdominal:      General: Bowel sounds are normal. There is no distension.      Palpations: Abdomen is soft.      Tenderness: There is abdominal tenderness. There is guarding.      Comments: Generalized abdominal tenderness with palpation, especially to epigastric area and RUQ, mild TTP to LLQ   Musculoskeletal:         General: Normal range of motion.      Cervical back: Neck supple.      Right lower leg: No edema.      Left lower leg: No edema.   Skin:     General: Skin is warm and dry.      Capillary Refill: Capillary refill takes less than 2 seconds.   Neurological:      General: No focal deficit present.      Mental Status: She is alert and oriented to person,  place, and time.   Psychiatric:         Mood and Affect: Mood normal.         Behavior: Behavior normal.         Thought Content: Thought content normal.              CRANIAL NERVES     CN III, IV, VI   Pupils are equal, round, and reactive to light.       Significant Labs: All pertinent labs within the past 24 hours have been reviewed.  Recent Lab Results         06/03/25  0503   06/03/25  0137   06/03/25  0039        Influenza A, Molecular     Negative       Influenza B, Molecular     Negative       Albumin   3.6         ALP   97         ALT   23         Anion Gap   14         Appearance, UA Clear           AST   26         Bacteria, UA Rare           Baso #   0.04         Basophil %   0.3         Bilirubin (UA) Negative           BILIRUBIN TOTAL   0.4  Comment: For infants and newborns, interpretation of results should be based   on gestational age, weight and in agreement with clinical   observations.    Premature Infant recommended reference ranges:   0-24 hours:  <8.0 mg/dL   24-48 hours: <12.0 mg/dL   3-5 days:    <15.0 mg/dL   6-29 days:   <15.0 mg/dL         BUN   8         Calcium   8.8         Chloride   104         CO2   17         Color, UA Yellow           SARS COV-2 MOLECULAR     Negative  Comment: This test utilizes isothermal nucleic acid amplification technology to detect the SARS-CoV-2 RdRp nucleic acid segment. The analytical sensitivity (limit of detection) is 500 copies/swab.     A POSITIVE result is indicative of the presence of SARS-CoV-2 RNA; clinical correlation with patient history and other diagnostic information is necessary to determine patient infection status.    A NEGATIVE result means that SARS-CoV-2 nucleic acids are not present above the limit of detection. A NEGATIVE result should be treated as presumptive. It does not rule out the possibility of COVID-19 and should not be the sole basis for treatment decisions.    This test is Food and Drug Administration (FDA) approved.   Performance characteristics of this test has been independently verified by [facility] Department of Pathology and Laboratory Medicine.       Creatinine   0.7         eGFR   >60  Comment: Estimated GFR calculated using the CKD-EPI creatinine (2021) equation.         Eos #   0.47         Eos %   3.4         Glucose   158         Glucose, UA Negative           Gran # (ANC)   10.78         Hematocrit   38.1         Hemoglobin   12.6         Hepatitis C Ab   Negative         HIV 1/2 Ag/Ab   Negative         Extra Tube Hold for add-ons.  Comment: Auto resulted.      Hold for add-ons.  Comment: Auto resulted.            Immature Grans (Abs)   0.06  Comment: Mild elevation in immature granulocytes is non specific and can be seen in a variety of conditions including stress response, acute inflammation, trauma and pregnancy. Correlation with other laboratory and clinical findings is essential.         Immature Granulocytes   0.4         Ketones, UA 2+           Leukocyte Esterase, UA Negative           Lipase   18         Lymph #   1.36         Lymph %   10.0         MCH   30.1         MCHC   33.1         MCV   91         Microscopic Comment   Comment: Other formed elements not mentioned in the report are not present in the microscopic examination.           Mono #   0.94         Mono %   6.9         MPV   9.7         Neut %   79.0         NITRITE UA Negative           nRBC   0         Blood, UA 1+           pH, UA 8.0           Platelet Count   363         Potassium   3.4         PROTEIN TOTAL   8.2         Protein, UA Negative  Comment: Recommend a 24 hour urine protein or a urine protein/creatinine ratio if globulin induced proteinuria is clinically suspected.           RBC   4.18         RBC, UA 3           RDW   12.9         Sodium   135         Spec Grav UA >=1.030           Squam Epithel, UA 1           Urobilinogen, UA Negative           WBC, UA 1           WBC   13.65                 Significant Imaging:  I have reviewed all pertinent imaging results/findings within the past 24 hours.    STAT Radiology Procedure Done:  CT Abdomen and Pelvis With Intravenous Contrast  Interpretation:  Mild thickening of the walls of the left colon and sigmoid. Incomplete distention versus mild colitis. Cholelithiasis in a distended gallbladder. Common bile duct. Non obstructing renal stones. Thickened urinary bladder. Check UTI/cystitis. 14 mm right renal artery aneurysm.  Radiologist:  Daniel Ochoa MD  6/3/2025  02:38    Assessment/Plan:     Assessment & Plan  Pain of upper abdomen  Mostly TTP to RUQ and epigastric area, she does have some mild TTP in LLQ  --CT final report is pending, prelim read showing gallstones, distended gallbladder with stone in gallbladder neck, as well as incidental finding of 14 mm right renal artery aneurysm, also suggestive of colitis  -- surgery consulted per ED, recommended US abdomen  -- consult vascular surgery for additional recommendations regarding right renal artery aneurysm  -- IV Zosyn for treatment of colitis  -- NPO  -- PRN pain medications ordered, PRN antiemetics ordered      Cholelithiasis  General surgery consulted   NPO for now  US abdomen is pending    Colitis  IV zosyn  PRN pain medication ordered  Npo for now    Essential hypertension  Patient's blood pressure range in the last 24 hours was: BP  Min: 136/78  Max: 195/95.The patient's inpatient anti-hypertensive regimen is listed below:  Current Antihypertensives  hydrALAZINE injection 10 mg, Every 6 hours PRN, Intravenous    Plan  - BP is controlled, no changes needed to their regimen  - Pain control helped with BP, holding home HCTZ for now  Renal artery aneurysm  Consult vascular surgery      VTE Risk Mitigation (From admission, onward)           Ordered     Reason for No Pharmacological VTE Prophylaxis  Once        Comments: May need surgery   Question:  Reasons:  Answer:  Physician Provided (leave comment)    06/03/25 0741     IP  VTE HIGH RISK PATIENT  Once         06/03/25 0741     Place sequential compression device  Until discontinued         06/03/25 0741                    On 06/03/2025, patient should be placed in hospital observation services under my care in collaboration with Dr. Chrissie Lopez.         Eli Sylvester NP  Department of Hospital Medicine  '[facility] - Emergency Dept.

## (undated) DEVICE — ENDOPATH XCEL BLADELESS TROCARS WITH STABILITY SLEEVES: Brand: ENDOPATH XCEL

## (undated) DEVICE — GOWN ,SIRUS,NONREINFORCED SMALL: Brand: MEDLINE

## (undated) DEVICE — GLV SURG BIOGEL LTX PF 6

## (undated) DEVICE — CATH CHOLANG 4.5F18IN BRGNDY

## (undated) DEVICE — CATH IV INSYTE AUTOGARD 14G 1 1/2IN ORNG

## (undated) DEVICE — ENDOPOUCH RETRIEVER SPECIMEN RETRIEVAL BAGS: Brand: ENDOPOUCH RETRIEVER

## (undated) DEVICE — SUT VIC 0 TN 27IN DYED JTN0G

## (undated) DEVICE — ENDOPATH PNEUMONEEDLE INSUFFLATION NEEDLES WITH LUER LOCK CONNECTORS 120MM: Brand: ENDOPATH

## (undated) DEVICE — LOU LAP CHOLE: Brand: MEDLINE INDUSTRIES, INC.

## (undated) DEVICE — PENCL E/S HNDSWCH ROCKR CB

## (undated) DEVICE — SKIN PREP TRAY W/CHG: Brand: MEDLINE INDUSTRIES, INC.

## (undated) DEVICE — CONTAINER,SPECIMEN,OR STERILE,4OZ: Brand: MEDLINE

## (undated) DEVICE — STPCK 3WY D201 DISCOFIX

## (undated) DEVICE — SOL NACL 0.9PCT 1000ML

## (undated) DEVICE — DRAPE,REIN 53X77,STERILE: Brand: MEDLINE

## (undated) DEVICE — ADHS SKIN DERMABOND TOP ADVANCED

## (undated) DEVICE — LAPAROSCOPIC SMOKE FILTRATION SYSTEM: Brand: PALL LAPAROSHIELD® PLUS LAPAROSCOPIC SMOKE FILTRATION SYSTEM

## (undated) DEVICE — EXTENSION SET, MALE LUER LOCK ADAPTER WITH RETRACTABLE COLLAR

## (undated) DEVICE — GLV SURG PREMIERPRO ORTHO LTX PF SZ7.5 BRN

## (undated) DEVICE — ENDOCUT SCISSOR TIP, DISPOSABLE: Brand: RENEW

## (undated) DEVICE — SUT VIC 5/0 PS2 18IN J495H